# Patient Record
Sex: FEMALE | Race: WHITE | Employment: OTHER | URBAN - METROPOLITAN AREA
[De-identification: names, ages, dates, MRNs, and addresses within clinical notes are randomized per-mention and may not be internally consistent; named-entity substitution may affect disease eponyms.]

---

## 2018-08-14 ENCOUNTER — HOSPITAL ENCOUNTER (OUTPATIENT)
Age: 68
Discharge: HOME | End: 2018-08-14
Payer: MEDICARE

## 2018-08-14 LAB
ALBUMIN SERPL-MCNC: 4.3 G/DL (ref 3.5–5.2)
ALP BLD-CCNC: 89 U/L (ref 35–104)
ALT SERPL-CCNC: 27 U/L (ref 0–32)
ANION GAP SERPL CALCULATED.3IONS-SCNC: 11 MMOL/L (ref 7–16)
AST SERPL-CCNC: 31 U/L (ref 0–31)
BASOPHILS ABSOLUTE: 0.03 E9/L (ref 0–0.2)
BASOPHILS RELATIVE PERCENT: 0.4 % (ref 0–2)
BILIRUB SERPL-MCNC: 0.4 MG/DL (ref 0–1.2)
BUN BLDV-MCNC: 8 MG/DL (ref 8–23)
CALCIUM SERPL-MCNC: 10 MG/DL (ref 8.6–10.2)
CHLORIDE BLD-SCNC: 90 MMOL/L (ref 98–107)
CHOLESTEROL, FASTING: 143 MG/DL (ref 0–199)
CO2: 33 MMOL/L (ref 22–29)
CREAT SERPL-MCNC: 0.7 MG/DL (ref 0.5–1)
EOSINOPHILS ABSOLUTE: 0.14 E9/L (ref 0.05–0.5)
EOSINOPHILS RELATIVE PERCENT: 2.1 % (ref 0–6)
GFR AFRICAN AMERICAN: >60
GFR NON-AFRICAN AMERICAN: >60 ML/MIN/1.73
GLUCOSE FASTING: 117 MG/DL (ref 74–109)
HCT VFR BLD CALC: 37.4 % (ref 34–48)
HDLC SERPL-MCNC: 65 MG/DL
HEMOGLOBIN: 12.4 G/DL (ref 11.5–15.5)
IMMATURE GRANULOCYTES #: 0.02 E9/L
IMMATURE GRANULOCYTES %: 0.3 % (ref 0–5)
LDL CHOLESTEROL CALCULATED: 62 MG/DL (ref 0–99)
LYMPHOCYTES ABSOLUTE: 1.6 E9/L (ref 1.5–4)
LYMPHOCYTES RELATIVE PERCENT: 23.9 % (ref 20–42)
MCH RBC QN AUTO: 27.4 PG (ref 26–35)
MCHC RBC AUTO-ENTMCNC: 33.2 % (ref 32–34.5)
MCV RBC AUTO: 82.6 FL (ref 80–99.9)
MICROALBUMIN UR-MCNC: <12 MG/L
MONOCYTES ABSOLUTE: 0.74 E9/L (ref 0.1–0.95)
MONOCYTES RELATIVE PERCENT: 11.1 % (ref 2–12)
NEUTROPHILS ABSOLUTE: 4.16 E9/L (ref 1.8–7.3)
NEUTROPHILS RELATIVE PERCENT: 62.2 % (ref 43–80)
PDW BLD-RTO: 13.6 FL (ref 11.5–15)
PLATELET # BLD: 159 E9/L (ref 130–450)
PMV BLD AUTO: 10.5 FL (ref 7–12)
POTASSIUM SERPL-SCNC: 4.4 MMOL/L (ref 3.5–5)
RBC # BLD: 4.53 E12/L (ref 3.5–5.5)
SODIUM BLD-SCNC: 134 MMOL/L (ref 132–146)
T4 FREE: 1.25 NG/DL (ref 0.93–1.7)
TOTAL PROTEIN: 7.9 G/DL (ref 6.4–8.3)
TRIGLYCERIDE, FASTING: 82 MG/DL (ref 0–149)
TSH SERPL DL<=0.05 MIU/L-ACNC: 1.5 UIU/ML (ref 0.27–4.2)
VITAMIN D 25-HYDROXY: 52 NG/ML (ref 30–100)
VLDLC SERPL CALC-MCNC: 16 MG/DL
WBC # BLD: 6.7 E9/L (ref 4.5–11.5)

## 2018-08-14 PROCEDURE — 80061 LIPID PANEL: CPT

## 2018-08-14 PROCEDURE — 36415 COLL VENOUS BLD VENIPUNCTURE: CPT

## 2018-08-14 PROCEDURE — 84443 ASSAY THYROID STIM HORMONE: CPT

## 2018-08-14 PROCEDURE — 82044 UR ALBUMIN SEMIQUANTITATIVE: CPT

## 2018-08-14 PROCEDURE — 85025 COMPLETE CBC W/AUTO DIFF WBC: CPT

## 2018-08-14 PROCEDURE — 84439 ASSAY OF FREE THYROXINE: CPT

## 2018-08-14 PROCEDURE — 83036 HEMOGLOBIN GLYCOSYLATED A1C: CPT

## 2018-08-14 PROCEDURE — 80053 COMPREHEN METABOLIC PANEL: CPT

## 2018-08-14 PROCEDURE — 82306 VITAMIN D 25 HYDROXY: CPT

## 2018-08-15 LAB — HBA1C MFR BLD: 6.5 % (ref 4–5.6)

## 2019-02-07 ENCOUNTER — HOSPITAL ENCOUNTER (OUTPATIENT)
Dept: MAMMOGRAPHY | Age: 69
Discharge: HOME | End: 2019-02-09
Payer: MEDICARE

## 2019-02-07 ENCOUNTER — HOSPITAL ENCOUNTER (OUTPATIENT)
Dept: GENERAL RADIOLOGY | Age: 69
Discharge: HOME | End: 2019-02-09
Payer: MEDICARE

## 2019-02-07 ENCOUNTER — HOSPITAL ENCOUNTER (OUTPATIENT)
Age: 69
Discharge: HOME | End: 2019-02-09
Payer: MEDICARE

## 2019-02-07 DIAGNOSIS — Z12.31 SCREENING MAMMOGRAM, ENCOUNTER FOR: ICD-10-CM

## 2019-02-07 DIAGNOSIS — J44.9 CHRONIC OBSTRUCTIVE PULMONARY DISEASE, UNSPECIFIED COPD TYPE (HCC): ICD-10-CM

## 2019-02-07 PROCEDURE — 77067 SCR MAMMO BI INCL CAD: CPT

## 2019-02-07 PROCEDURE — 71046 X-RAY EXAM CHEST 2 VIEWS: CPT

## 2019-06-17 ENCOUNTER — HOSPITAL ENCOUNTER (OUTPATIENT)
Age: 69
Discharge: HOME | End: 2019-06-17
Payer: MEDICARE

## 2019-06-17 LAB
ALBUMIN SERPL-MCNC: 4.3 G/DL (ref 3.5–5.2)
ALP BLD-CCNC: 96 U/L (ref 35–104)
ALT SERPL-CCNC: 15 U/L (ref 0–32)
ANION GAP SERPL CALCULATED.3IONS-SCNC: 12 MMOL/L (ref 7–16)
AST SERPL-CCNC: 19 U/L (ref 0–31)
BASOPHILS ABSOLUTE: 0.05 E9/L (ref 0–0.2)
BASOPHILS RELATIVE PERCENT: 0.8 % (ref 0–2)
BILIRUB SERPL-MCNC: 0.4 MG/DL (ref 0–1.2)
BUN BLDV-MCNC: 11 MG/DL (ref 8–23)
CALCIUM SERPL-MCNC: 10.1 MG/DL (ref 8.6–10.2)
CHLORIDE BLD-SCNC: 98 MMOL/L (ref 98–107)
CHOLESTEROL, FASTING: 160 MG/DL (ref 0–199)
CO2: 32 MMOL/L (ref 22–29)
CREAT SERPL-MCNC: 0.8 MG/DL (ref 0.5–1)
EOSINOPHILS ABSOLUTE: 0.36 E9/L (ref 0.05–0.5)
EOSINOPHILS RELATIVE PERCENT: 5.8 % (ref 0–6)
GFR AFRICAN AMERICAN: >60
GFR NON-AFRICAN AMERICAN: >60 ML/MIN/1.73
GLUCOSE FASTING: 119 MG/DL (ref 74–99)
HBA1C MFR BLD: 6.5 % (ref 4–5.6)
HCT VFR BLD CALC: 39.7 % (ref 34–48)
HDLC SERPL-MCNC: 66 MG/DL
HEMOGLOBIN: 12.7 G/DL (ref 11.5–15.5)
IMMATURE GRANULOCYTES #: 0.03 E9/L
IMMATURE GRANULOCYTES %: 0.5 % (ref 0–5)
LDL CHOLESTEROL CALCULATED: 63 MG/DL (ref 0–99)
LYMPHOCYTES ABSOLUTE: 1.8 E9/L (ref 1.5–4)
LYMPHOCYTES RELATIVE PERCENT: 28.8 % (ref 20–42)
MCH RBC QN AUTO: 26.8 PG (ref 26–35)
MCHC RBC AUTO-ENTMCNC: 32 % (ref 32–34.5)
MCV RBC AUTO: 83.9 FL (ref 80–99.9)
MICROALBUMIN UR-MCNC: <12 MG/L
MONOCYTES ABSOLUTE: 0.6 E9/L (ref 0.1–0.95)
MONOCYTES RELATIVE PERCENT: 9.6 % (ref 2–12)
NEUTROPHILS ABSOLUTE: 3.41 E9/L (ref 1.8–7.3)
NEUTROPHILS RELATIVE PERCENT: 54.5 % (ref 43–80)
PDW BLD-RTO: 14.1 FL (ref 11.5–15)
PLATELET # BLD: 174 E9/L (ref 130–450)
PMV BLD AUTO: 10.7 FL (ref 7–12)
POTASSIUM SERPL-SCNC: 4.3 MMOL/L (ref 3.5–5)
RBC # BLD: 4.73 E12/L (ref 3.5–5.5)
SODIUM BLD-SCNC: 142 MMOL/L (ref 132–146)
T4 FREE: 1.11 NG/DL (ref 0.93–1.7)
TOTAL PROTEIN: 8.2 G/DL (ref 6.4–8.3)
TRIGLYCERIDE, FASTING: 154 MG/DL (ref 0–149)
VITAMIN D 25-HYDROXY: 44 NG/ML (ref 30–100)
VLDLC SERPL CALC-MCNC: 31 MG/DL
WBC # BLD: 6.3 E9/L (ref 4.5–11.5)

## 2019-06-17 PROCEDURE — 80053 COMPREHEN METABOLIC PANEL: CPT

## 2019-06-17 PROCEDURE — 83036 HEMOGLOBIN GLYCOSYLATED A1C: CPT

## 2019-06-17 PROCEDURE — 85025 COMPLETE CBC W/AUTO DIFF WBC: CPT

## 2019-06-17 PROCEDURE — 82044 UR ALBUMIN SEMIQUANTITATIVE: CPT

## 2019-06-17 PROCEDURE — 80061 LIPID PANEL: CPT

## 2019-06-17 PROCEDURE — 84443 ASSAY THYROID STIM HORMONE: CPT

## 2019-06-17 PROCEDURE — 82306 VITAMIN D 25 HYDROXY: CPT

## 2019-06-17 PROCEDURE — 36415 COLL VENOUS BLD VENIPUNCTURE: CPT

## 2019-06-17 PROCEDURE — 84439 ASSAY OF FREE THYROXINE: CPT

## 2019-06-18 LAB — TSH SERPL DL<=0.05 MIU/L-ACNC: 2.19 UIU/ML (ref 0.27–4.2)

## 2020-05-04 ENCOUNTER — HOSPITAL ENCOUNTER (OUTPATIENT)
Age: 70
Discharge: HOME | End: 2020-05-04
Payer: MEDICARE

## 2020-05-04 LAB
ALBUMIN SERPL-MCNC: 4.4 G/DL (ref 3.5–5.2)
ALP BLD-CCNC: 98 U/L (ref 35–104)
ALT SERPL-CCNC: 17 U/L (ref 0–32)
ANION GAP SERPL CALCULATED.3IONS-SCNC: 16 MMOL/L (ref 7–16)
AST SERPL-CCNC: 22 U/L (ref 0–31)
BASOPHILS ABSOLUTE: 0.04 E9/L (ref 0–0.2)
BASOPHILS RELATIVE PERCENT: 0.6 % (ref 0–2)
BILIRUB SERPL-MCNC: 0.4 MG/DL (ref 0–1.2)
BUN BLDV-MCNC: 10 MG/DL (ref 8–23)
CALCIUM SERPL-MCNC: 10.3 MG/DL (ref 8.6–10.2)
CHLORIDE BLD-SCNC: 94 MMOL/L (ref 98–107)
CHOLESTEROL, TOTAL: 154 MG/DL (ref 0–199)
CO2: 28 MMOL/L (ref 22–29)
CREAT SERPL-MCNC: 0.7 MG/DL (ref 0.5–1)
EOSINOPHILS ABSOLUTE: 0.27 E9/L (ref 0.05–0.5)
EOSINOPHILS RELATIVE PERCENT: 3.9 % (ref 0–6)
GFR AFRICAN AMERICAN: >60
GFR NON-AFRICAN AMERICAN: >60 ML/MIN/1.73
GLUCOSE BLD-MCNC: 121 MG/DL (ref 74–99)
HBA1C MFR BLD: 6.4 % (ref 4–5.6)
HCT VFR BLD CALC: 39.2 % (ref 34–48)
HDLC SERPL-MCNC: 58 MG/DL
HEMOGLOBIN: 12.8 G/DL (ref 11.5–15.5)
IMMATURE GRANULOCYTES #: 0.02 E9/L
IMMATURE GRANULOCYTES %: 0.3 % (ref 0–5)
LDL CHOLESTEROL CALCULATED: 79 MG/DL (ref 0–99)
LYMPHOCYTES ABSOLUTE: 1.77 E9/L (ref 1.5–4)
LYMPHOCYTES RELATIVE PERCENT: 25.6 % (ref 20–42)
MCH RBC QN AUTO: 27.6 PG (ref 26–35)
MCHC RBC AUTO-ENTMCNC: 32.7 % (ref 32–34.5)
MCV RBC AUTO: 84.7 FL (ref 80–99.9)
MONOCYTES ABSOLUTE: 0.6 E9/L (ref 0.1–0.95)
MONOCYTES RELATIVE PERCENT: 8.7 % (ref 2–12)
NEUTROPHILS ABSOLUTE: 4.22 E9/L (ref 1.8–7.3)
NEUTROPHILS RELATIVE PERCENT: 60.9 % (ref 43–80)
PDW BLD-RTO: 14.3 FL (ref 11.5–15)
PLATELET # BLD: 161 E9/L (ref 130–450)
PMV BLD AUTO: 10.7 FL (ref 7–12)
POTASSIUM SERPL-SCNC: 4.1 MMOL/L (ref 3.5–5)
RBC # BLD: 4.63 E12/L (ref 3.5–5.5)
SODIUM BLD-SCNC: 138 MMOL/L (ref 132–146)
TOTAL PROTEIN: 8.3 G/DL (ref 6.4–8.3)
TRIGL SERPL-MCNC: 83 MG/DL (ref 0–149)
TSH SERPL DL<=0.05 MIU/L-ACNC: 1.55 UIU/ML (ref 0.27–4.2)
VLDLC SERPL CALC-MCNC: 17 MG/DL
WBC # BLD: 6.9 E9/L (ref 4.5–11.5)

## 2020-05-04 PROCEDURE — 84443 ASSAY THYROID STIM HORMONE: CPT

## 2020-05-04 PROCEDURE — 80061 LIPID PANEL: CPT

## 2020-05-04 PROCEDURE — 36415 COLL VENOUS BLD VENIPUNCTURE: CPT

## 2020-05-04 PROCEDURE — 83036 HEMOGLOBIN GLYCOSYLATED A1C: CPT

## 2020-05-04 PROCEDURE — 85025 COMPLETE CBC W/AUTO DIFF WBC: CPT

## 2020-05-04 PROCEDURE — 80053 COMPREHEN METABOLIC PANEL: CPT

## 2020-07-01 ENCOUNTER — HOSPITAL ENCOUNTER (OUTPATIENT)
Dept: MAMMOGRAPHY | Age: 70
Discharge: HOME | End: 2020-07-03
Payer: MEDICARE

## 2020-07-01 PROCEDURE — 77067 SCR MAMMO BI INCL CAD: CPT

## 2020-09-28 VITALS
RESPIRATION RATE: 14 BRPM | TEMPERATURE: 96.8 F | WEIGHT: 168 LBS | DIASTOLIC BLOOD PRESSURE: 80 MMHG | SYSTOLIC BLOOD PRESSURE: 140 MMHG | BODY MASS INDEX: 25.54 KG/M2 | HEART RATE: 82 BPM

## 2020-09-28 RX ORDER — GLIPIZIDE 5 MG/1
5 TABLET, FILM COATED, EXTENDED RELEASE ORAL DAILY
COMMUNITY
End: 2021-03-22

## 2020-09-28 RX ORDER — METFORMIN HYDROCHLORIDE 500 MG/1
500 TABLET, EXTENDED RELEASE ORAL
COMMUNITY
End: 2021-03-22

## 2020-09-28 RX ORDER — LOSARTAN POTASSIUM AND HYDROCHLOROTHIAZIDE 12.5; 5 MG/1; MG/1
1 TABLET ORAL DAILY
COMMUNITY
End: 2020-12-31

## 2020-10-24 ENCOUNTER — HOSPITAL ENCOUNTER (OUTPATIENT)
Age: 70
Discharge: HOME | End: 2020-10-24
Payer: MEDICARE

## 2020-10-24 LAB
ALBUMIN SERPL-MCNC: 4.2 G/DL (ref 3.5–5.2)
ALP BLD-CCNC: 91 U/L (ref 35–104)
ALT SERPL-CCNC: 31 U/L (ref 0–32)
ANION GAP SERPL CALCULATED.3IONS-SCNC: 11 MMOL/L (ref 7–16)
AST SERPL-CCNC: 35 U/L (ref 0–31)
BASOPHILS ABSOLUTE: 0.04 E9/L (ref 0–0.2)
BASOPHILS RELATIVE PERCENT: 0.6 % (ref 0–2)
BILIRUB SERPL-MCNC: 0.3 MG/DL (ref 0–1.2)
BUN BLDV-MCNC: 12 MG/DL (ref 8–23)
CALCIUM SERPL-MCNC: 9.8 MG/DL (ref 8.6–10.2)
CHLORIDE BLD-SCNC: 97 MMOL/L (ref 98–107)
CHOLESTEROL, FASTING: 142 MG/DL (ref 0–199)
CO2: 29 MMOL/L (ref 22–29)
CREAT SERPL-MCNC: 0.8 MG/DL (ref 0.5–1)
EOSINOPHILS ABSOLUTE: 0.35 E9/L (ref 0.05–0.5)
EOSINOPHILS RELATIVE PERCENT: 5.1 % (ref 0–6)
GFR AFRICAN AMERICAN: >60
GFR NON-AFRICAN AMERICAN: >60 ML/MIN/1.73
GLUCOSE FASTING: 116 MG/DL (ref 74–99)
HBA1C MFR BLD: 6.5 % (ref 4–5.6)
HCT VFR BLD CALC: 38.3 % (ref 34–48)
HDLC SERPL-MCNC: 63 MG/DL
HEMOGLOBIN: 12.5 G/DL (ref 11.5–15.5)
IMMATURE GRANULOCYTES #: 0.02 E9/L
IMMATURE GRANULOCYTES %: 0.3 % (ref 0–5)
LDL CHOLESTEROL CALCULATED: 59 MG/DL (ref 0–99)
LYMPHOCYTES ABSOLUTE: 1.93 E9/L (ref 1.5–4)
LYMPHOCYTES RELATIVE PERCENT: 28 % (ref 20–42)
MCH RBC QN AUTO: 28.1 PG (ref 26–35)
MCHC RBC AUTO-ENTMCNC: 32.6 % (ref 32–34.5)
MCV RBC AUTO: 86.1 FL (ref 80–99.9)
MONOCYTES ABSOLUTE: 0.61 E9/L (ref 0.1–0.95)
MONOCYTES RELATIVE PERCENT: 8.8 % (ref 2–12)
NEUTROPHILS ABSOLUTE: 3.95 E9/L (ref 1.8–7.3)
NEUTROPHILS RELATIVE PERCENT: 57.2 % (ref 43–80)
PDW BLD-RTO: 14.3 FL (ref 11.5–15)
PLATELET # BLD: 174 E9/L (ref 130–450)
PMV BLD AUTO: 10.2 FL (ref 7–12)
POTASSIUM SERPL-SCNC: 4.7 MMOL/L (ref 3.5–5)
RBC # BLD: 4.45 E12/L (ref 3.5–5.5)
SODIUM BLD-SCNC: 137 MMOL/L (ref 132–146)
TOTAL PROTEIN: 7.9 G/DL (ref 6.4–8.3)
TRIGLYCERIDE, FASTING: 99 MG/DL (ref 0–149)
VLDLC SERPL CALC-MCNC: 20 MG/DL
WBC # BLD: 6.9 E9/L (ref 4.5–11.5)

## 2020-10-24 PROCEDURE — 83036 HEMOGLOBIN GLYCOSYLATED A1C: CPT

## 2020-10-24 PROCEDURE — 36415 COLL VENOUS BLD VENIPUNCTURE: CPT

## 2020-10-24 PROCEDURE — 80061 LIPID PANEL: CPT

## 2020-10-24 PROCEDURE — 85025 COMPLETE CBC W/AUTO DIFF WBC: CPT

## 2020-10-24 PROCEDURE — 80053 COMPREHEN METABOLIC PANEL: CPT

## 2020-11-04 RX ORDER — OMEPRAZOLE 40 MG/1
40 CAPSULE, DELAYED RELEASE ORAL DAILY
Qty: 30 CAPSULE | Refills: 0 | Status: SHIPPED
Start: 2020-11-04 | End: 2021-03-22

## 2020-12-14 RX ORDER — OMEPRAZOLE 40 MG/1
40 CAPSULE, DELAYED RELEASE ORAL
Qty: 90 CAPSULE | Refills: 1 | Status: SHIPPED
Start: 2020-12-14 | End: 2021-01-03

## 2020-12-23 ENCOUNTER — TELEPHONE (OUTPATIENT)
Dept: FAMILY MEDICINE CLINIC | Age: 70
End: 2020-12-23

## 2020-12-23 NOTE — TELEPHONE ENCOUNTER
Pt called Rx for handicap placard needs expiration date it can be faxed to  [phone]      Last seen Visit date not found  Next appt 1/13/2021

## 2020-12-28 ENCOUNTER — TELEPHONE (OUTPATIENT)
Dept: FAMILY MEDICINE CLINIC | Age: 70
End: 2020-12-28

## 2020-12-31 RX ORDER — LOSARTAN POTASSIUM AND HYDROCHLOROTHIAZIDE 12.5; 5 MG/1; MG/1
TABLET ORAL
Qty: 90 TABLET | Refills: 1 | Status: SHIPPED
Start: 2020-12-31 | End: 2021-01-13

## 2021-01-03 ENCOUNTER — APPOINTMENT (OUTPATIENT)
Dept: GENERAL RADIOLOGY | Age: 71
End: 2021-01-03
Payer: MEDICARE

## 2021-01-03 ENCOUNTER — HOSPITAL ENCOUNTER (EMERGENCY)
Age: 71
Discharge: HOME | End: 2021-01-03
Attending: STUDENT IN AN ORGANIZED HEALTH CARE EDUCATION/TRAINING PROGRAM
Payer: MEDICARE

## 2021-01-03 VITALS
WEIGHT: 160 LBS | SYSTOLIC BLOOD PRESSURE: 144 MMHG | HEART RATE: 106 BPM | OXYGEN SATURATION: 98 % | DIASTOLIC BLOOD PRESSURE: 97 MMHG | HEIGHT: 68 IN | TEMPERATURE: 97.7 F | BODY MASS INDEX: 24.25 KG/M2 | RESPIRATION RATE: 16 BRPM

## 2021-01-03 DIAGNOSIS — T14.8XXA HEMATOMA: Primary | ICD-10-CM

## 2021-01-03 PROCEDURE — 73590 X-RAY EXAM OF LOWER LEG: CPT

## 2021-01-03 PROCEDURE — G0382 LEV 3 HOSP TYPE B ED VISIT: HCPCS

## 2021-01-03 NOTE — ED PROVIDER NOTES
HPI:  1/3/21,   Time: 12:12 PM ATA De Anda is a 72 y.o. female presenting to the ED for right tibial swelling. Patient states that 1 month ago she bumped into a coffee table on her right mid shin. She had significant swelling about the size of a baseball. Over time the swelling has reduced and for the last 2 weeks this is the size of a golf ball. It is tender when she touches the area. She also noticed some bruising that spread down towards her foot initially on the medial side and now spreading towards the lateral side. Patient denies any difficulty with ambulation, pain with ambulation. She only takes aspirin and denies any other blood thinner use. Denies any sensory or motor changes. ROS:   Pertinent positives and negatives are stated within HPI, all other systems reviewed and are negative.  --------------------------------------------- PAST HISTORY ---------------------------------------------  Past Medical History:  has a past medical history of Asthma, COPD (chronic obstructive pulmonary disease) ([facility] [address] Depression, Diabetes mellitus ([facility] [address] GERD (gastroesophageal reflux disease), Hyperlipidemia, Hypertension, and Type 2 diabetes mellitus without complication ([facility] [address] Past Surgical History:  has a past surgical history that includes Appendectomy; Carpal tunnel release; Tubal ligation; Bunionectomy; Hammer toe surgery; Cardiac catheterization; Total knee arthroplasty (May, 2015); and knee joint manipulation (Right, 6/18/15). Social History:  reports that she quit smoking about 13 years ago. Her smoking use included cigarettes. She has a 30.00 pack-year smoking history. She quit smokeless tobacco use about 14 years ago. She reports current alcohol use of about 2.5 standard drinks of alcohol per week. She reports that she does not use drugs.     Family History: family history includes Cancer in her sister; Diabetes in her mother; Heart Disease in her father; High Blood Pressure in her brother and sister; Kidney Disease in her mother; Devin Ovalle in her sister and son. The patients home medications have been reviewed. Allergies: Ace inhibitors    -------------------------------------------------- RESULTS -------------------------------------------------  All laboratory and radiology results have been personally reviewed by myself   LABS:  No results found for this visit on 01/03/21. RADIOLOGY:  Interpreted by Radiologist.  XR TIBIA FIBULA RIGHT (2 VIEWS)   Final Result   1. There is no fracture or acute abnormality of the right tibia and fibula.          ------------------------- NURSING NOTES AND VITALS REVIEWED ---------------------------   The nursing notes within the ED encounter and vital signs as below have been reviewed. BP (!) 144/97   Pulse 106   Temp 97.7 °F (36.5 °C) (Temporal)   Resp 16   Ht 5' 8\" (1.727 m)   Wt 160 lb (72.6 kg)   SpO2 98%   BMI 24.33 kg/m²   Oxygen Saturation Interpretation: Normal      ---------------------------------------------------PHYSICAL EXAM--------------------------------------    Constitutional/General: Alert and oriented x3, well appearing, non toxic in NAD  Head: NC/AT  Eyes: PERRL, EOMI  Mouth: Oropharynx clear, handling secretions, no trismus  Neck: Supple, full ROM, no meningeal signs  Pulmonary: Lungs clear to auscultation bilaterally, no wheezes, rales, or rhonchi. Not in respiratory distress  Cardiovascular:  Regular rate and rhythm, no murmurs, gallops, or rubs. 2+ distal pulses  Abdomen: Soft, non tender, non distended,   Extremities: Moves all extremities x 4. Warm and well perfused  Skin: Small swelling over the mid medial right tibia. Mild tenderness to palpation. Firm. Nonmobile. No posterior calf tenderness. Dorsalis pedis and posterior tibial pulses 2+. Sensation intact to lower extremity to light touch. Capillary refill less than 3 seconds.   Neurologic: GCS 15,  Psych: Normal Affect      ------------------------------ ED COURSE/MEDICAL DECISION MAKING----------------------  Medications - No data to display      Medical Decision Makin-year-old female with a suspected hematoma to right tibia. Vitals within normal limits (elevated HR on triage vitals improved by the time of my exam). Exam does reveal swelling that is firm, nonmobile. I suspect hematoma however would like to rule out other pathology with an x-ray. This was ordered. This did not show any acute abnormalities or fractures, previous or cute. I do feel patient is safe for discharge home. She does not have any evidence of DVT on exam.  She is neurovascularly intact. I recommend that she follow-up with her primary care doctor regarding a suspected chronic hematoma. Did recommend warm compresses. Anti-inflammatories for pain. Patient states understanding agreement will follow up with her primary care physician as discussed. Counseling: The emergency provider has spoken with the patient and discussed todays results, in addition to providing specific details for the plan of care and counseling regarding the diagnosis and prognosis. Questions are answered at this time and they are agreeable with the plan.      --------------------------------- IMPRESSION AND DISPOSITION ---------------------------------    IMPRESSION  1.  Hematoma        DISPOSITION  Disposition: Discharge to home  Patient condition is good                 Farrah Angulo, DO  [address], DO  21 [phone]

## 2021-01-13 ENCOUNTER — OFFICE VISIT (OUTPATIENT)
Dept: FAMILY MEDICINE CLINIC | Age: 71
End: 2021-01-13
Payer: MEDICARE

## 2021-01-13 VITALS
TEMPERATURE: 97.5 F | WEIGHT: 168 LBS | HEIGHT: 66 IN | SYSTOLIC BLOOD PRESSURE: 114 MMHG | HEART RATE: 98 BPM | DIASTOLIC BLOOD PRESSURE: 78 MMHG | BODY MASS INDEX: 27 KG/M2 | OXYGEN SATURATION: 97 %

## 2021-01-13 DIAGNOSIS — E11.9 CONTROLLED TYPE 2 DIABETES MELLITUS WITHOUT COMPLICATION, WITHOUT LONG-TERM CURRENT USE OF INSULIN (HCC): ICD-10-CM

## 2021-01-13 DIAGNOSIS — J44.9 CHRONIC OBSTRUCTIVE PULMONARY DISEASE, UNSPECIFIED COPD TYPE (HCC): ICD-10-CM

## 2021-01-13 DIAGNOSIS — S80.11XA HEMATOMA OF RIGHT LOWER LEG: ICD-10-CM

## 2021-01-13 DIAGNOSIS — I10 ESSENTIAL HYPERTENSION: ICD-10-CM

## 2021-01-13 DIAGNOSIS — F32.A DEPRESSION, UNSPECIFIED DEPRESSION TYPE: ICD-10-CM

## 2021-01-13 DIAGNOSIS — T78.3XXD ANGIOEDEMA, SUBSEQUENT ENCOUNTER: Primary | ICD-10-CM

## 2021-01-13 DIAGNOSIS — J43.9 PULMONARY EMPHYSEMA, UNSPECIFIED EMPHYSEMA TYPE (HCC): ICD-10-CM

## 2021-01-13 DIAGNOSIS — E78.5 HYPERLIPIDEMIA, UNSPECIFIED HYPERLIPIDEMIA TYPE: ICD-10-CM

## 2021-01-13 PROCEDURE — G8484 FLU IMMUNIZE NO ADMIN: HCPCS | Performed by: INTERNAL MEDICINE

## 2021-01-13 PROCEDURE — G8427 DOCREV CUR MEDS BY ELIG CLIN: HCPCS | Performed by: INTERNAL MEDICINE

## 2021-01-13 PROCEDURE — G8926 SPIRO NO PERF OR DOC: HCPCS | Performed by: INTERNAL MEDICINE

## 2021-01-13 PROCEDURE — 3023F SPIROM DOC REV: CPT | Performed by: INTERNAL MEDICINE

## 2021-01-13 PROCEDURE — 4040F PNEUMOC VAC/ADMIN/RCVD: CPT | Performed by: INTERNAL MEDICINE

## 2021-01-13 PROCEDURE — 3046F HEMOGLOBIN A1C LEVEL >9.0%: CPT | Performed by: INTERNAL MEDICINE

## 2021-01-13 PROCEDURE — G8400 PT W/DXA NO RESULTS DOC: HCPCS | Performed by: INTERNAL MEDICINE

## 2021-01-13 PROCEDURE — 1036F TOBACCO NON-USER: CPT | Performed by: INTERNAL MEDICINE

## 2021-01-13 PROCEDURE — 2022F DILAT RTA XM EVC RTNOPTHY: CPT | Performed by: INTERNAL MEDICINE

## 2021-01-13 PROCEDURE — G8417 CALC BMI ABV UP PARAM F/U: HCPCS | Performed by: INTERNAL MEDICINE

## 2021-01-13 PROCEDURE — 99214 OFFICE O/P EST MOD 30 MIN: CPT | Performed by: INTERNAL MEDICINE

## 2021-01-13 PROCEDURE — 3017F COLORECTAL CA SCREEN DOC REV: CPT | Performed by: INTERNAL MEDICINE

## 2021-01-13 PROCEDURE — 1123F ACP DISCUSS/DSCN MKR DOCD: CPT | Performed by: INTERNAL MEDICINE

## 2021-01-13 PROCEDURE — 1090F PRES/ABSN URINE INCON ASSESS: CPT | Performed by: INTERNAL MEDICINE

## 2021-01-13 RX ORDER — AMLODIPINE BESYLATE 5 MG/1
5 TABLET ORAL DAILY
Qty: 30 TABLET | Refills: 3 | Status: SHIPPED
Start: 2021-01-13 | End: 2021-01-26 | Stop reason: SDUPTHER

## 2021-01-13 RX ORDER — METHYLPREDNISOLONE 4 MG/1
TABLET ORAL
Qty: 1 KIT | Refills: 0 | Status: SHIPPED | OUTPATIENT
Start: 2021-01-13 | End: 2021-01-19

## 2021-01-13 SDOH — ECONOMIC STABILITY: TRANSPORTATION INSECURITY
IN THE PAST 12 MONTHS, HAS LACK OF TRANSPORTATION KEPT YOU FROM MEETINGS, WORK, OR FROM GETTING THINGS NEEDED FOR DAILY LIVING?: NO

## 2021-01-13 ASSESSMENT — PATIENT HEALTH QUESTIONNAIRE - PHQ9
2. FEELING DOWN, DEPRESSED OR HOPELESS: 0
1. LITTLE INTEREST OR PLEASURE IN DOING THINGS: 0

## 2021-01-13 NOTE — PROGRESS NOTES
Subjective:     Chief Complaint   Patient presents with    Facial Swelling   Complains of swelling of the face on and off for the past 6 months the lips and the face started to swell she takes a Claritin  She has taken Benadryl which makes her sleepy  She has gone to urgent care she was given Medrol Dosepak    The face was swollen twice in the last 3 months it last for few hours and gets better  Denies any tongue swelling or shortness of breath    Patient is taking losartan HCTZ for the blood pressure    She denies any other allergies      A month ago she hit her right leg against the table there was a little swelling on the medial aspect middle of the right lower leg she did go to urgent care had x-rays done which was reviewed which showed swelling which is a hematoma which is improving    Follow-up on the COPD has occasional dyspnea on exertion denies any chest pain or palpitation      She had a cardiac catheterization  with Dr. Leoncio Robles which was negative      Follow-up on the hypertension          Past Medical History:   Diagnosis Date    Asthma     [facility] obstructive [facility] ([facility]     Depression     Diabetes mellitus ([facility]     GERD (gastroesophageal reflux disease)     Hyperlipidemia     Hypertension     Type 2 diabetes mellitus without complication ([facility]         Social History     Socioeconomic History    Marital status:       Spouse name: Not on file    Number of children: Not on file    Years of education: Not on file    Highest education level: Not on file   Occupational History    Not on file   Social Needs    Financial resource strain: Not on file    Food insecurity     Worry: Never true     Inability: Never true    Transportation needs     Medical: No     Non-medical: No   Tobacco Use    Smoking status: Former Smoker     Packs/day: 1.00     Years: 30.00     Pack years: 30.00     Types: Cigarettes     Quit date: 2007     Years since quittin.3  Smokeless tobacco: Former User     Quit date: 2006   Substance and Sexual Activity    Alcohol use: Yes     Alcohol/week: 2.5 standard drinks     Types: 3 Standard drinks or equivalent per week     Comment: occ    Drug use: No    Sexual activity: Not on file   Lifestyle    Physical activity     Days per week: Not on file     Minutes per session: Not on file    Stress: Not on file   Relationships    Social connections     Talks on phone: Not on file     Gets together: Not on file     Attends Voodoo service: Not on file     Active member of club or organization: Not on file     Attends meetings of clubs or organizations: Not on file     Relationship status: Not on file    Intimate partner violence     Fear of current or ex partner: Not on file     Emotionally abused: Not on file     Physically abused: Not on file     Forced sexual activity: Not on file   Other Topics Concern    Not on file   Social History Narrative    Not on file        Past Surgical History:   Procedure Laterality Date    APPENDECTOMY      BUNIONECTOMY      rt   [address]       states was normal    CARPAL TUNNEL RELEASE      rt hand    HAMMER TOE SURGERY      rt    KNEE JOINT MANIPULATION Right 6/18/15    TOTAL KNEE ARTHROPLASTY  May, 2015    Right    TUBAL LIGATION          Family History   Problem Relation Age of Onset    Diabetes Mother     Kidney Disease Mother     Heart Disease Father     High Blood Pressure Sister     Lung Cancer Sister     High Blood Pressure Brother     Cancer Sister         leukemia    Lung Cancer Son         w/ brain mets  age 55        Allergies   Allergen Reactions    Ace Inhibitors      Cough           ROS  No acute distress  Cardiac: Denies any chest pain or palpitation  Respiratory: Denies any cough , minimal dyspnea with exertion from COPD possible  GI: No abdominal pain.  Denies any nausea vomiting or diarrhea had colonoscopy  by Dr. Katherine Kennedy : Denies any dysuria frequency or hematuria  Neuro: No headache or dizziness  Endocrine: No diabetes  Skin: normal  No recent weight gain or weight loss  Denies any change in vision    Objective:    /78   Pulse 98   Temp 97.5 °F (36.4 °C)   Ht 5' 5.5\" (1.664 m)   Wt 168 lb (76.2 kg)   SpO2 97%   BMI 27.53 kg/m²     Constitutional: Alert awake and oriented  Eyes: Pupils equal bilaterally. Extraocular muscles intact  Neck: no JVD adenopathy no bruit no adenopathy in the neck  Heart:  RRR, no murmurs, gallops, or rubs. Lungs:    no wheeze, rales or rhonchi  Abd: bowel sounds present, nontender, nondistended, no masses  Extrem:  No clubbing, cyanosis, hematoma resolving right lower leg medial aspect nontender no signs of infection  Neuro: AAOx3,No Focal deficit  Psychological: no depression or anxiety   Distal pulses intact in the feet  No calf tenderness  Homans test negative bilaterally    Current Outpatient Medications   Medication Sig Dispense Refill    amLODIPine (NORVASC) 5 MG tablet Take 1 tablet by mouth daily 30 tablet 3    methylPREDNISolone (MEDROL DOSEPACK) 4 MG tablet Take by mouth. 1 kit 0    omeprazole (PRILOSEC) 40 MG delayed release capsule Take 1 capsule by mouth daily 30 capsule 0    metFORMIN (GLUCOPHAGE-XR) 500 MG extended release tablet Take 500 mg by mouth 3 times daily (before meals)      glipiZIDE (GLIPIZIDE XL) 5 MG extended release tablet Take 5 mg by mouth daily      aspirin 81 MG tablet Take 81 mg by mouth every evening      citalopram (CELEXA) 20 MG tablet Take 20 mg by mouth daily evening      pravastatin (PRAVACHOL) 40 MG tablet Take 40 mg by mouth evening       No current facility-administered medications for this visit.          Last 3 BMP  Lab Results   Component Value Date/Time     10/24/2020 09:04 AM     05/04/2020 11:45 AM     06/17/2019 09:12 AM    K 4.7 10/24/2020 09:04 AM    K 4.1 05/04/2020 11:45 AM    K 4.3 06/17/2019 09:12 AM CL 97 (L) 10/24/2020 09:04 AM    CL 94 (L) 05/04/2020 11:45 AM    CL 98 06/17/2019 09:12 AM    CO2 29 10/24/2020 09:04 AM    CO2 28 05/04/2020 11:45 AM    CO2 32 (H) 06/17/2019 09:12 AM    BUN 12 10/24/2020 09:04 AM    BUN 10 05/04/2020 11:45 AM    BUN 11 06/17/2019 09:12 AM    CREATININE 0.8 10/24/2020 09:04 AM    CREATININE 0.7 05/04/2020 11:45 AM    CREATININE 0.8 06/17/2019 09:12 AM    GLUCOSE 121 (H) 05/04/2020 11:45 AM    GLUCOSE 105 12/22/2017 09:05 AM    GLUCOSE 119 (H) 01/26/2017 09:05 AM    GLUCOSE 111 (H) 03/10/2012 08:10 AM    GLUCOSE 114 (H) 05/14/2011 08:00 AM    CALCIUM 9.8 10/24/2020 09:04 AM    CALCIUM 10.3 (H) 05/04/2020 11:45 AM    CALCIUM 10.1 06/17/2019 09:12 AM       Last 3 CMP:    Lab Results   Component Value Date/Time     10/24/2020 09:04 AM     05/04/2020 11:45 AM     06/17/2019 09:12 AM    K 4.7 10/24/2020 09:04 AM    K 4.1 05/04/2020 11:45 AM    K 4.3 06/17/2019 09:12 AM    CL 97 (L) 10/24/2020 09:04 AM    CL 94 (L) 05/04/2020 11:45 AM    CL 98 06/17/2019 09:12 AM    CO2 29 10/24/2020 09:04 AM    CO2 28 05/04/2020 11:45 AM    CO2 32 (H) 06/17/2019 09:12 AM    BUN 12 10/24/2020 09:04 AM    BUN 10 05/04/2020 11:45 AM    BUN 11 06/17/2019 09:12 AM    CREATININE 0.8 10/24/2020 09:04 AM    CREATININE 0.7 05/04/2020 11:45 AM    CREATININE 0.8 06/17/2019 09:12 AM    GLUCOSE 121 (H) 05/04/2020 11:45 AM    GLUCOSE 105 12/22/2017 09:05 AM    GLUCOSE 119 (H) 01/26/2017 09:05 AM    GLUCOSE 111 (H) 03/10/2012 08:10 AM    GLUCOSE 114 (H) 05/14/2011 08:00 AM    CALCIUM 9.8 10/24/2020 09:04 AM    CALCIUM 10.3 (H) 05/04/2020 11:45 AM    CALCIUM 10.1 06/17/2019 09:12 AM    PROT 7.9 10/24/2020 09:04 AM    PROT 8.3 05/04/2020 11:45 AM    PROT 8.2 06/17/2019 09:12 AM    LABALBU 4.2 10/24/2020 09:04 AM    LABALBU 4.4 05/04/2020 11:45 AM    LABALBU 4.3 06/17/2019 09:12 AM    LABALBU 4.6 03/10/2012 08:10 AM    LABALBU 4.5 05/14/2011 08:00 AM    BILITOT 0.3 10/24/2020 09:04 AM BILITOT 0.4 05/04/2020 11:45 AM    BILITOT 0.4 06/17/2019 09:12 AM    ALKPHOS 91 10/24/2020 09:04 AM    ALKPHOS 98 05/04/2020 11:45 AM    ALKPHOS 96 06/17/2019 09:12 AM    AST 35 (H) 10/24/2020 09:04 AM    AST 22 05/04/2020 11:45 AM    AST 19 06/17/2019 09:12 AM    ALT 31 10/24/2020 09:04 AM    ALT 17 05/04/2020 11:45 AM    ALT 15 06/17/2019 09:12 AM        CBC:   Lab Results   Component Value Date/Time    WBC 6.9 10/24/2020 09:04 AM    RBC 4.45 10/24/2020 09:04 AM    HGB 12.5 10/24/2020 09:04 AM    HCT 38.3 10/24/2020 09:04 AM    MCV 86.1 10/24/2020 09:04 AM    MCH 28.1 10/24/2020 09:04 AM    MCHC 32.6 10/24/2020 09:04 AM    RDW 14.3 10/24/2020 09:04 AM     10/24/2020 09:04 AM    MPV 10.2 10/24/2020 09:04 AM       A1C:  Lab Results   Component Value Date/Time    LABA1C 6.5 (H) 10/24/2020 09:04 AM       Lipid panel:  Lab Results   Component Value Date    CHOL 154 05/04/2020    CHOL 183 01/26/2017    CHOL 168 03/12/2016    TRIG 83 05/04/2020    TRIG 95 01/26/2017    TRIG 112 03/12/2016    HDL 63 10/24/2020    HDL 58 05/04/2020    HDL 66 06/17/2019        Lab Results   Component Value Date/Time    PROT 7.9 10/24/2020 09:04 AM    PROT 8.3 05/04/2020 11:45 AM    PROT 8.2 06/17/2019 09:12 AM    INR 1.0 05/08/2015 09:25 AM    INR 1.0 06/06/2014 09:15 AM       No results found for: MG      Assessment. Maikel Selby was seen today for facial swelling. Diagnoses and all orders for this visit:    Angioedema, subsequent encounter  -     External Referral To Allergy  -     CBC Auto Differential; Future    Essential hypertension    Depression, unspecified depression type    Pulmonary emphysema, unspecified emphysema type ([facility] [address]    Controlled type 2 diabetes mellitus without complication, without long-term current use of insulin (HCC)  -     Hemoglobin A1C; Future    Chronic obstructive pulmonary disease, unspecified COPD type (HCC)  -     XR CHEST (2 VW);  Future  -     CBC Auto Differential; Future Hyperlipidemia, unspecified hyperlipidemia type  -     Comprehensive Metabolic Panel; Future  -     Lipid Panel; Future  -     TSH without Reflex; Future    Hematoma of right lower leg    Other orders  -     amLODIPine (NORVASC) 5 MG tablet; Take 1 tablet by mouth daily  -     methylPREDNISolone (MEDROL DOSEPACK) 4 MG tablet; Take by mouth. Patient Active Problem List   Diagnosis    COPD [facility]chronic obstructive [facility] disease) ([facility]    DM (diabetes mellitus) ([facility]    Arthrofibrosis of total knee arthroplasty ([facility]       Plan: It appears patient had angioedema it could be side effect of losartan, discontinue losartan  Start on amlodipine 5 mg daily  She has taken lisinopril in the past she has been off that    Recheck blood pressure in couple of months with a new medication  Low-salt diet      For the hematoma on the right leg  Use some heat slowly it should resolve      Refer to Dr. Dameon Monroy for angioedema      Prescription for Medrol Dosepak was given to use it immediately if swelling starts go to ER if shortness of breath or tongue swollen or hoarseness      Depression controlled with medication denies any suicidal ideation      Check CMP lipid profile before next visit        Follow back in 2 months          Return in about 2 months (around 3/13/2021).        Marisela Jordan MD  11:09 AM  1/13/2021     DE

## 2021-01-23 ENCOUNTER — HOSPITAL ENCOUNTER (OUTPATIENT)
Age: 71
Discharge: HOME | End: 2021-01-23
Payer: MEDICARE

## 2021-01-23 LAB
BASOPHILS ABSOLUTE: 0.02 E9/L (ref 0–0.2)
BASOPHILS RELATIVE PERCENT: 0.3 % (ref 0–2)
C3 COMPLEMENT: 135 MG/DL (ref 90–180)
C4 COMPLEMENT: 30 MG/DL (ref 10–40)
EOSINOPHILS ABSOLUTE: 0.2 E9/L (ref 0.05–0.5)
EOSINOPHILS RELATIVE PERCENT: 2.7 % (ref 0–6)
HCT VFR BLD CALC: 40.3 % (ref 34–48)
HEMOGLOBIN: 12.7 G/DL (ref 11.5–15.5)
IMMATURE GRANULOCYTES #: 0.03 E9/L
IMMATURE GRANULOCYTES %: 0.4 % (ref 0–5)
LYMPHOCYTES ABSOLUTE: 2.29 E9/L (ref 1.5–4)
LYMPHOCYTES RELATIVE PERCENT: 30.9 % (ref 20–42)
MCH RBC QN AUTO: 28.1 PG (ref 26–35)
MCHC RBC AUTO-ENTMCNC: 31.5 % (ref 32–34.5)
MCV RBC AUTO: 89.2 FL (ref 80–99.9)
MONOCYTES ABSOLUTE: 0.78 E9/L (ref 0.1–0.95)
MONOCYTES RELATIVE PERCENT: 10.5 % (ref 2–12)
NEUTROPHILS ABSOLUTE: 4.08 E9/L (ref 1.8–7.3)
NEUTROPHILS RELATIVE PERCENT: 55.2 % (ref 43–80)
PDW BLD-RTO: 14.1 FL (ref 11.5–15)
PLATELET # BLD: 175 E9/L (ref 130–450)
PMV BLD AUTO: 9.7 FL (ref 7–12)
RBC # BLD: 4.52 E12/L (ref 3.5–5.5)
WBC # BLD: 7.4 E9/L (ref 4.5–11.5)

## 2021-01-23 PROCEDURE — 86160 COMPLEMENT ANTIGEN: CPT

## 2021-01-23 PROCEDURE — 36415 COLL VENOUS BLD VENIPUNCTURE: CPT

## 2021-01-23 PROCEDURE — 82785 ASSAY OF IGE: CPT

## 2021-01-23 PROCEDURE — 83520 IMMUNOASSAY QUANT NOS NONAB: CPT

## 2021-01-23 PROCEDURE — 86162 COMPLEMENT TOTAL (CH50): CPT

## 2021-01-23 PROCEDURE — 85025 COMPLETE CBC W/AUTO DIFF WBC: CPT

## 2021-01-26 LAB — COMPLEMENT ACTIVITY, TOTAL TURBIDIMETRIC: 50.1 U/ML (ref 38.7–89.9)

## 2021-01-26 RX ORDER — AMLODIPINE BESYLATE 5 MG/1
5 TABLET ORAL DAILY
Qty: 90 TABLET | Refills: 1 | Status: SHIPPED
Start: 2021-01-26 | End: 2021-08-20

## 2021-01-27 LAB
IGE: 417 KU/L
MISCELLANEOUS LAB TEST RESULT: NORMAL

## 2021-03-06 ENCOUNTER — HOSPITAL ENCOUNTER (OUTPATIENT)
Age: 71
Discharge: HOME | End: 2021-03-06
Payer: MEDICARE

## 2021-03-06 DIAGNOSIS — T78.3XXD ANGIOEDEMA, SUBSEQUENT ENCOUNTER: ICD-10-CM

## 2021-03-06 DIAGNOSIS — J44.9 CHRONIC OBSTRUCTIVE PULMONARY DISEASE, UNSPECIFIED COPD TYPE (HCC): ICD-10-CM

## 2021-03-06 DIAGNOSIS — E78.5 HYPERLIPIDEMIA, UNSPECIFIED HYPERLIPIDEMIA TYPE: ICD-10-CM

## 2021-03-06 DIAGNOSIS — E11.9 CONTROLLED TYPE 2 DIABETES MELLITUS WITHOUT COMPLICATION, WITHOUT LONG-TERM CURRENT USE OF INSULIN (HCC): ICD-10-CM

## 2021-03-06 LAB
ALBUMIN SERPL-MCNC: 4.1 G/DL (ref 3.5–5.2)
ALP BLD-CCNC: 80 U/L (ref 35–104)
ALT SERPL-CCNC: 19 U/L (ref 0–32)
ANION GAP SERPL CALCULATED.3IONS-SCNC: 8 MMOL/L (ref 7–16)
AST SERPL-CCNC: 26 U/L (ref 0–31)
BASOPHILS ABSOLUTE: 0.04 E9/L (ref 0–0.2)
BASOPHILS RELATIVE PERCENT: 0.6 % (ref 0–2)
BILIRUB SERPL-MCNC: 0.3 MG/DL (ref 0–1.2)
BUN BLDV-MCNC: 7 MG/DL (ref 8–23)
CALCIUM SERPL-MCNC: 10 MG/DL (ref 8.6–10.2)
CHLORIDE BLD-SCNC: 99 MMOL/L (ref 98–107)
CHOLESTEROL, TOTAL: 137 MG/DL (ref 0–199)
CO2: 31 MMOL/L (ref 22–29)
CREAT SERPL-MCNC: 0.7 MG/DL (ref 0.5–1)
EOSINOPHILS ABSOLUTE: 0.29 E9/L (ref 0.05–0.5)
EOSINOPHILS RELATIVE PERCENT: 4.7 % (ref 0–6)
GFR AFRICAN AMERICAN: >60
GFR NON-AFRICAN AMERICAN: >60 ML/MIN/1.73
GLUCOSE BLD-MCNC: 121 MG/DL (ref 74–99)
HBA1C MFR BLD: 6.6 % (ref 4–5.6)
HCT VFR BLD CALC: 40 % (ref 34–48)
HDLC SERPL-MCNC: 54 MG/DL
HEMOGLOBIN: 12.8 G/DL (ref 11.5–15.5)
IMMATURE GRANULOCYTES #: 0.01 E9/L
IMMATURE GRANULOCYTES %: 0.2 % (ref 0–5)
LDL CHOLESTEROL CALCULATED: 64 MG/DL (ref 0–99)
LYMPHOCYTES ABSOLUTE: 1.7 E9/L (ref 1.5–4)
LYMPHOCYTES RELATIVE PERCENT: 27.6 % (ref 20–42)
MCH RBC QN AUTO: 27.6 PG (ref 26–35)
MCHC RBC AUTO-ENTMCNC: 32 % (ref 32–34.5)
MCV RBC AUTO: 86.2 FL (ref 80–99.9)
MONOCYTES ABSOLUTE: 0.57 E9/L (ref 0.1–0.95)
MONOCYTES RELATIVE PERCENT: 9.3 % (ref 2–12)
NEUTROPHILS ABSOLUTE: 3.55 E9/L (ref 1.8–7.3)
NEUTROPHILS RELATIVE PERCENT: 57.6 % (ref 43–80)
PDW BLD-RTO: 13.9 FL (ref 11.5–15)
PLATELET # BLD: 185 E9/L (ref 130–450)
PMV BLD AUTO: 10.6 FL (ref 7–12)
POTASSIUM SERPL-SCNC: 4.2 MMOL/L (ref 3.5–5)
RBC # BLD: 4.64 E12/L (ref 3.5–5.5)
SODIUM BLD-SCNC: 138 MMOL/L (ref 132–146)
TOTAL PROTEIN: 7.6 G/DL (ref 6.4–8.3)
TRIGL SERPL-MCNC: 96 MG/DL (ref 0–149)
TSH SERPL DL<=0.05 MIU/L-ACNC: 1.82 UIU/ML (ref 0.27–4.2)
VLDLC SERPL CALC-MCNC: 19 MG/DL
WBC # BLD: 6.2 E9/L (ref 4.5–11.5)

## 2021-03-06 PROCEDURE — 84443 ASSAY THYROID STIM HORMONE: CPT

## 2021-03-06 PROCEDURE — 80061 LIPID PANEL: CPT

## 2021-03-06 PROCEDURE — 80053 COMPREHEN METABOLIC PANEL: CPT

## 2021-03-06 PROCEDURE — 83036 HEMOGLOBIN GLYCOSYLATED A1C: CPT

## 2021-03-06 PROCEDURE — 36415 COLL VENOUS BLD VENIPUNCTURE: CPT

## 2021-03-06 PROCEDURE — 85025 COMPLETE CBC W/AUTO DIFF WBC: CPT

## 2021-03-17 ENCOUNTER — OFFICE VISIT (OUTPATIENT)
Dept: FAMILY MEDICINE CLINIC | Age: 71
End: 2021-03-17
Payer: MEDICARE

## 2021-03-17 VITALS
WEIGHT: 165.2 LBS | OXYGEN SATURATION: 97 % | TEMPERATURE: 96.9 F | BODY MASS INDEX: 27.52 KG/M2 | SYSTOLIC BLOOD PRESSURE: 144 MMHG | DIASTOLIC BLOOD PRESSURE: 102 MMHG | HEIGHT: 65 IN | HEART RATE: 111 BPM | RESPIRATION RATE: 16 BRPM

## 2021-03-17 DIAGNOSIS — I10 ESSENTIAL HYPERTENSION: Primary | ICD-10-CM

## 2021-03-17 DIAGNOSIS — E78.5 HYPERLIPIDEMIA, UNSPECIFIED HYPERLIPIDEMIA TYPE: ICD-10-CM

## 2021-03-17 DIAGNOSIS — E11.9 CONTROLLED TYPE 2 DIABETES MELLITUS WITHOUT COMPLICATION, WITHOUT LONG-TERM CURRENT USE OF INSULIN (HCC): ICD-10-CM

## 2021-03-17 DIAGNOSIS — T78.3XXD ANGIOEDEMA, SUBSEQUENT ENCOUNTER: ICD-10-CM

## 2021-03-17 PROCEDURE — 99213 OFFICE O/P EST LOW 20 MIN: CPT | Performed by: INTERNAL MEDICINE

## 2021-03-17 PROCEDURE — 1123F ACP DISCUSS/DSCN MKR DOCD: CPT | Performed by: INTERNAL MEDICINE

## 2021-03-17 PROCEDURE — 2022F DILAT RTA XM EVC RTNOPTHY: CPT | Performed by: INTERNAL MEDICINE

## 2021-03-17 PROCEDURE — 3017F COLORECTAL CA SCREEN DOC REV: CPT | Performed by: INTERNAL MEDICINE

## 2021-03-17 PROCEDURE — 4040F PNEUMOC VAC/ADMIN/RCVD: CPT | Performed by: INTERNAL MEDICINE

## 2021-03-17 PROCEDURE — G8417 CALC BMI ABV UP PARAM F/U: HCPCS | Performed by: INTERNAL MEDICINE

## 2021-03-17 PROCEDURE — 3044F HG A1C LEVEL LT 7.0%: CPT | Performed by: INTERNAL MEDICINE

## 2021-03-17 PROCEDURE — G8427 DOCREV CUR MEDS BY ELIG CLIN: HCPCS | Performed by: INTERNAL MEDICINE

## 2021-03-17 PROCEDURE — G8400 PT W/DXA NO RESULTS DOC: HCPCS | Performed by: INTERNAL MEDICINE

## 2021-03-17 PROCEDURE — 1090F PRES/ABSN URINE INCON ASSESS: CPT | Performed by: INTERNAL MEDICINE

## 2021-03-17 PROCEDURE — G8484 FLU IMMUNIZE NO ADMIN: HCPCS | Performed by: INTERNAL MEDICINE

## 2021-03-17 PROCEDURE — 1036F TOBACCO NON-USER: CPT | Performed by: INTERNAL MEDICINE

## 2021-03-17 RX ORDER — EPINEPHRINE 0.3 MG/.3ML
INJECTION SUBCUTANEOUS
COMMUNITY
Start: 2021-02-10

## 2021-03-17 RX ORDER — TRIAMTERENE AND HYDROCHLOROTHIAZIDE 37.5; 25 MG/1; MG/1
1 TABLET ORAL DAILY
Qty: 30 TABLET | Refills: 3 | Status: SHIPPED
Start: 2021-03-17 | End: 2021-06-21

## 2021-03-17 NOTE — PROGRESS NOTES
Subjective:     Chief Complaint   Patient presents with    Hypertension     pt has has swelling in her face thinking due to meds. she going to see a Allergies Dr for it and has a epipen now. Pt BP is high her dog just passed      Follow-up on hypertension    Still having swelling of the lips    Her ARB's were stopped still 4 days ago she got lip swollen  She is being followed up with the allergist Dr. Zackery Harrington has done extensive blood work she has a follow-up appointment  Denies any swelling of throat or shortness of breath    Depression is controlled        Past Medical History:   Diagnosis Date    Asthma     [facility]     Depression     Diabetes mellitus ([facility]     GERD (gastroesophageal reflux disease)     Hyperlipidemia     Hypertension     Type 2 diabetes mellitus without complication ([facility]         Social History     Socioeconomic History    Marital status:      Spouse name: Not on file    Number of children: Not on file    Years of education: Not on file    Highest education level: Not on file   Occupational History    Not on file   Social Needs    Financial resource strain: Not on file    Food insecurity     Worry: Never true     Inability: Never true    Transportation needs     Medical: No     Non-medical: No   Tobacco Use    Smoking status: Former Smoker     Packs/day: 1.00     Years: 30.00     Pack years: 30.00     Types: Cigarettes     Quit date: 2007     Years since quittin.4    Smokeless tobacco: Former User     Quit date: 2006   Substance and Sexual Activity    Alcohol use:  Yes     Alcohol/week: 2.5 standard drinks     Types: 3 Standard drinks or equivalent per week     Comment: occ    Drug use: No    Sexual activity: Not on file   Lifestyle    Physical activity     Days per week: Not on file     Minutes per session: Not on file    Stress: Not on file   Relationships    Social connections     Talks on phone: Not on file     Gets together: Not on file     Attends Jehovah's witness service: Not on file     Active member of club or organization: Not on file     Attends meetings of clubs or organizations: Not on file     Relationship status: Not on file    Intimate partner violence     Fear of current or ex partner: Not on file     Emotionally abused: Not on file     Physically abused: Not on file     Forced sexual activity: Not on file   Other Topics Concern    Not on file   Social History Narrative    Not on file        Past Surgical History:   Procedure Laterality Date    APPENDECTOMY      BUNIONECTOMY      rt   [address]       states was normal    CARPAL TUNNEL RELEASE      rt hand    HAMMER TOE SURGERY      rt    KNEE JOINT MANIPULATION Right 6/18/15    TOTAL KNEE ARTHROPLASTY  May, 2015    Right    TUBAL LIGATION          Family History   Problem Relation Age of Onset    Diabetes Mother     Kidney Disease Mother     Heart Disease Father     High Blood Pressure Sister     Lung Cancer Sister     High Blood Pressure Brother     Cancer Sister         leukemia    Lung Cancer Son         w/ brain mets  age 55        Allergies   Allergen Reactions    Ace Inhibitors      Cough           ROS  No acute distress  Cardiac: Denies any chest pain or palpitation  Respiratory: Denies any cough or shortness of breath  GI: No abdominal pain. Denies any nausea vomiting or diarrhea colonoscopy 2018 by Dr. Sandrine Aguilar repeat in  per GI  : Denies any dysuria frequency or hematuria  Neuro: No headache or dizziness  Endocrine: hx  diabetes  Skin: normal  No recent weight gain or weight loss  Denies any change in vision    Objective:    BP (!) 144/102   Pulse 111   Temp 96.9 °F (36.1 °C) (Temporal)   Resp 16   Ht 5' 5\" (1.651 m)   Wt 165 lb 3.2 oz (74.9 kg)   SpO2 97%   BMI 27.49 kg/m²     Constitutional: Alert awake and oriented  Eyes: Pupils equal bilaterally.  Extraocular muscles intact  Neck: no GLUCOSE 105 12/22/2017 09:05 AM    GLUCOSE 111 (H) 03/10/2012 08:10 AM    GLUCOSE 114 (H) 05/14/2011 08:00 AM    CALCIUM 10.0 03/06/2021 09:40 AM    CALCIUM 9.8 10/24/2020 09:04 AM    CALCIUM 10.3 (H) 05/04/2020 11:45 AM       Last 3 CMP:    Lab Results   Component Value Date/Time     03/06/2021 09:40 AM     10/24/2020 09:04 AM     05/04/2020 11:45 AM    K 4.2 03/06/2021 09:40 AM    K 4.7 10/24/2020 09:04 AM    K 4.1 05/04/2020 11:45 AM    CL 99 03/06/2021 09:40 AM    CL 97 (L) 10/24/2020 09:04 AM    CL 94 (L) 05/04/2020 11:45 AM    CO2 31 (H) 03/06/2021 09:40 AM    CO2 29 10/24/2020 09:04 AM    CO2 28 05/04/2020 11:45 AM    BUN 7 (L) 03/06/2021 09:40 AM    BUN 12 10/24/2020 09:04 AM    BUN 10 05/04/2020 11:45 AM    CREATININE 0.7 03/06/2021 09:40 AM    CREATININE 0.8 10/24/2020 09:04 AM    CREATININE 0.7 05/04/2020 11:45 AM    GLUCOSE 121 (H) 03/06/2021 09:40 AM    GLUCOSE 121 (H) 05/04/2020 11:45 AM    GLUCOSE 105 12/22/2017 09:05 AM    GLUCOSE 111 (H) 03/10/2012 08:10 AM    GLUCOSE 114 (H) 05/14/2011 08:00 AM    CALCIUM 10.0 03/06/2021 09:40 AM    CALCIUM 9.8 10/24/2020 09:04 AM    CALCIUM 10.3 (H) 05/04/2020 11:45 AM    PROT 7.6 03/06/2021 09:40 AM    PROT 7.9 10/24/2020 09:04 AM    PROT 8.3 05/04/2020 11:45 AM    LABALBU 4.1 03/06/2021 09:40 AM    LABALBU 4.2 10/24/2020 09:04 AM    LABALBU 4.4 05/04/2020 11:45 AM    LABALBU 4.6 03/10/2012 08:10 AM    LABALBU 4.5 05/14/2011 08:00 AM    BILITOT 0.3 03/06/2021 09:40 AM    BILITOT 0.3 10/24/2020 09:04 AM    BILITOT 0.4 05/04/2020 11:45 AM    ALKPHOS 80 03/06/2021 09:40 AM    ALKPHOS 91 10/24/2020 09:04 AM    ALKPHOS 98 05/04/2020 11:45 AM    AST 26 03/06/2021 09:40 AM    AST 35 (H) 10/24/2020 09:04 AM    AST 22 05/04/2020 11:45 AM    ALT 19 03/06/2021 09:40 AM    ALT 31 10/24/2020 09:04 AM    ALT 17 05/04/2020 11:45 AM        CBC:   Lab Results   Component Value Date/Time    WBC 6.2 03/06/2021 09:40 AM    RBC 4.64 03/06/2021 09:40 AM    HGB 12.8 03/06/2021 09:40 AM    HCT 40.0 03/06/2021 09:40 AM    MCV 86.2 03/06/2021 09:40 AM    MCH 27.6 03/06/2021 09:40 AM    MCHC 32.0 03/06/2021 09:40 AM    RDW 13.9 03/06/2021 09:40 AM     03/06/2021 09:40 AM    MPV 10.6 03/06/2021 09:40 AM       A1C:  Lab Results   Component Value Date/Time    LABA1C 6.6 (H) 03/06/2021 09:40 AM       Lipid panel:  Lab Results   Component Value Date    CHOL 137 03/06/2021    CHOL 154 05/04/2020    CHOL 183 01/26/2017    TRIG 96 03/06/2021    TRIG 83 05/04/2020    TRIG 95 01/26/2017    HDL 54 03/06/2021    HDL 63 10/24/2020    HDL 58 05/04/2020        Lab Results   Component Value Date/Time    PROT 7.6 03/06/2021 09:40 AM    PROT 7.9 10/24/2020 09:04 AM    PROT 8.3 05/04/2020 11:45 AM    INR 1.0 05/08/2015 09:25 AM    INR 1.0 06/06/2014 09:15 AM       No results found for: MG      Destiny Chatterjee was seen today for hypertension. Diagnoses and all orders for this visit:    Essential hypertension  -     BASIC METABOLIC PANEL; Future    Controlled type 2 diabetes mellitus without complication, without long-term current use of insulin ([facility])    Hyperlipidemia, unspecified hyperlipidemia type    Angioedema, subsequent encounter    Other orders  -     triamterene-hydroCHLOROthiazide (MAXZIDE-25) 37.5-25 MG per tablet;  Take 1 tablet by mouth daily       Patient Active Problem List   Diagnosis    [facility]ch[facility] obstructive [facility]) [facility]    DM (diabetes mellitus) ([facility]    Arthrofibrosis of total knee arthroplasty ([facility]       Plan: Hypertension not to the goal  Add Maxide 25 mg daily  Continue Norvasc  Her ARB was stopped possible angioedema but she has angioedema again even without ARB  She will discuss with the allergist    Diabetes is controlled A1c 6.6%    Hyperlipidemia doing well cholesterol 137 LDL 64      Depression controlled denies any suicidal ideation    Advised to get mammogram she will discuss with the gynecologist    Return in about 2 months (around 5/17/2021).        Julian Caba MD  1:11 PM  3/17/2021     [place]

## 2021-03-22 RX ORDER — PRAVASTATIN SODIUM 40 MG
TABLET ORAL
Qty: 90 TABLET | Refills: 1 | Status: SHIPPED
Start: 2021-03-22 | End: 2021-09-27 | Stop reason: SDUPTHER

## 2021-03-22 RX ORDER — GLIPIZIDE 5 MG/1
TABLET, FILM COATED, EXTENDED RELEASE ORAL
Qty: 90 TABLET | Refills: 1 | Status: SHIPPED
Start: 2021-03-22 | End: 2021-09-27 | Stop reason: SDUPTHER

## 2021-03-22 RX ORDER — METFORMIN HYDROCHLORIDE 500 MG/1
TABLET, EXTENDED RELEASE ORAL
Qty: 270 TABLET | Refills: 1 | Status: SHIPPED
Start: 2021-03-22 | End: 2021-11-08 | Stop reason: SDUPTHER

## 2021-03-22 RX ORDER — OMEPRAZOLE 40 MG/1
CAPSULE, DELAYED RELEASE ORAL
Qty: 90 CAPSULE | Refills: 1 | Status: SHIPPED
Start: 2021-03-22 | End: 2021-06-21 | Stop reason: SDUPTHER

## 2021-03-22 RX ORDER — CITALOPRAM 20 MG/1
TABLET ORAL
Qty: 90 TABLET | Refills: 1 | Status: SHIPPED
Start: 2021-03-22 | End: 2021-09-27 | Stop reason: SDUPTHER

## 2021-05-11 ENCOUNTER — HOSPITAL ENCOUNTER (OUTPATIENT)
Age: 71
Discharge: HOME | End: 2021-05-11
Payer: MEDICARE

## 2021-05-11 DIAGNOSIS — I10 ESSENTIAL HYPERTENSION: ICD-10-CM

## 2021-05-11 LAB
ANION GAP SERPL CALCULATED.3IONS-SCNC: 9 MMOL/L (ref 7–16)
BUN BLDV-MCNC: 9 MG/DL (ref 6–23)
CALCIUM SERPL-MCNC: 9.8 MG/DL (ref 8.6–10.2)
CHLORIDE BLD-SCNC: 88 MMOL/L (ref 98–107)
CO2: 34 MMOL/L (ref 22–29)
CREAT SERPL-MCNC: 0.7 MG/DL (ref 0.5–1)
GFR AFRICAN AMERICAN: >60
GFR NON-AFRICAN AMERICAN: >60 ML/MIN/1.73
GLUCOSE BLD-MCNC: 101 MG/DL (ref 74–99)
POTASSIUM SERPL-SCNC: 4.1 MMOL/L (ref 3.5–5)
SODIUM BLD-SCNC: 131 MMOL/L (ref 132–146)

## 2021-05-11 PROCEDURE — 80048 BASIC METABOLIC PNL TOTAL CA: CPT

## 2021-05-11 PROCEDURE — 36415 COLL VENOUS BLD VENIPUNCTURE: CPT

## 2021-06-21 ENCOUNTER — OFFICE VISIT (OUTPATIENT)
Dept: FAMILY MEDICINE CLINIC | Age: 71
End: 2021-06-21
Payer: MEDICARE

## 2021-06-21 VITALS
DIASTOLIC BLOOD PRESSURE: 84 MMHG | SYSTOLIC BLOOD PRESSURE: 130 MMHG | WEIGHT: 161 LBS | BODY MASS INDEX: 26.79 KG/M2 | OXYGEN SATURATION: 93 % | HEART RATE: 98 BPM

## 2021-06-21 DIAGNOSIS — T78.3XXD ANGIOEDEMA, SUBSEQUENT ENCOUNTER: ICD-10-CM

## 2021-06-21 DIAGNOSIS — K21.9 GASTROESOPHAGEAL REFLUX DISEASE WITHOUT ESOPHAGITIS: ICD-10-CM

## 2021-06-21 DIAGNOSIS — I10 ESSENTIAL HYPERTENSION: Primary | ICD-10-CM

## 2021-06-21 DIAGNOSIS — J44.9 CHRONIC OBSTRUCTIVE PULMONARY DISEASE, UNSPECIFIED COPD TYPE (HCC): ICD-10-CM

## 2021-06-21 DIAGNOSIS — F32.A DEPRESSION, UNSPECIFIED DEPRESSION TYPE: ICD-10-CM

## 2021-06-21 DIAGNOSIS — E11.9 TYPE 2 DIABETES MELLITUS WITHOUT COMPLICATION, WITHOUT LONG-TERM CURRENT USE OF INSULIN (HCC): ICD-10-CM

## 2021-06-21 DIAGNOSIS — E78.5 HYPERLIPIDEMIA, UNSPECIFIED HYPERLIPIDEMIA TYPE: ICD-10-CM

## 2021-06-21 DIAGNOSIS — E87.1 HYPONATREMIA: ICD-10-CM

## 2021-06-21 PROBLEM — T78.3XXA ANGIO-EDEMA: Status: ACTIVE | Noted: 2021-06-21

## 2021-06-21 PROCEDURE — 1036F TOBACCO NON-USER: CPT | Performed by: INTERNAL MEDICINE

## 2021-06-21 PROCEDURE — 3017F COLORECTAL CA SCREEN DOC REV: CPT | Performed by: INTERNAL MEDICINE

## 2021-06-21 PROCEDURE — G8417 CALC BMI ABV UP PARAM F/U: HCPCS | Performed by: INTERNAL MEDICINE

## 2021-06-21 PROCEDURE — G8400 PT W/DXA NO RESULTS DOC: HCPCS | Performed by: INTERNAL MEDICINE

## 2021-06-21 PROCEDURE — 2022F DILAT RTA XM EVC RTNOPTHY: CPT | Performed by: INTERNAL MEDICINE

## 2021-06-21 PROCEDURE — 4040F PNEUMOC VAC/ADMIN/RCVD: CPT | Performed by: INTERNAL MEDICINE

## 2021-06-21 PROCEDURE — 1123F ACP DISCUSS/DSCN MKR DOCD: CPT | Performed by: INTERNAL MEDICINE

## 2021-06-21 PROCEDURE — 99213 OFFICE O/P EST LOW 20 MIN: CPT | Performed by: INTERNAL MEDICINE

## 2021-06-21 PROCEDURE — 3023F SPIROM DOC REV: CPT | Performed by: INTERNAL MEDICINE

## 2021-06-21 PROCEDURE — G8427 DOCREV CUR MEDS BY ELIG CLIN: HCPCS | Performed by: INTERNAL MEDICINE

## 2021-06-21 PROCEDURE — 3044F HG A1C LEVEL LT 7.0%: CPT | Performed by: INTERNAL MEDICINE

## 2021-06-21 PROCEDURE — 1090F PRES/ABSN URINE INCON ASSESS: CPT | Performed by: INTERNAL MEDICINE

## 2021-06-21 PROCEDURE — G8926 SPIRO NO PERF OR DOC: HCPCS | Performed by: INTERNAL MEDICINE

## 2021-06-21 RX ORDER — TRIAMTERENE AND HYDROCHLOROTHIAZIDE 37.5; 25 MG/1; MG/1
0.5 TABLET ORAL DAILY
COMMUNITY
End: 2021-11-08 | Stop reason: SDUPTHER

## 2021-06-21 RX ORDER — OMEPRAZOLE 40 MG/1
40 CAPSULE, DELAYED RELEASE ORAL DAILY
Qty: 90 CAPSULE | Refills: 1 | Status: SHIPPED
Start: 2021-06-21 | End: 2022-01-07

## 2021-06-21 NOTE — PROGRESS NOTES
Subjective:     Chief Complaint   Patient presents with    3 Month Follow-Up        Past Medical History:   Diagnosis Date    Asthma     COPD (chronic obstructive pulmonary disease) ([facility]     Depression     Diabetes mellitus ([facility]     GERD (gastroesophageal reflux disease)     Hyperlipidemia     Hypertension     Type 2 diabetes mellitus without complication ([facility]         Social History     Socioeconomic History    Marital status:      Spouse name: Not on file    Number of children: Not on file    Years of education: Not on file    Highest education level: Not on file   Occupational History    Not on file   Tobacco Use    Smoking status: Former Smoker     Packs/day: 1.00     Years: 30.00     Pack years: 30.00     Types: Cigarettes     Quit date: 2007     Years since quittin.7    Smokeless tobacco: Former User     Quit date: 2006   Substance and Sexual Activity    Alcohol use: Yes     Alcohol/week: 2.5 standard drinks     Types: 3 Standard drinks or equivalent per week     Comment: occ    Drug use: No    Sexual activity: Not on file   Other Topics Concern    Not on file   Social History Narrative    Not on file     Social Determinants of Health     Financial Resource Strain:     Difficulty of Paying Living Expenses:    Food Insecurity: No Food Insecurity    Worried About Running Out of Food in the Last Year: Never true    Yo of Food in the Last Year: Never true   Transportation Needs: No Transportation Needs    Lack of Transportation (Medical): No    Lack of Transportation (Non-Medical):  No   Physical Activity:     Days of Exercise per Week:     Minutes of Exercise per Session:    Stress:     Feeling of Stress :    Social Connections:     Frequency of Communication with Friends and Family:     Frequency of Social Gatherings with Friends and Family:     Attends [employer] Services:     Active Member of Clubs or Organizations:     Attends Club or Organization Meetings:     Marital Status:    Intimate Partner Violence:     Fear of Current or Ex-Partner:     Emotionally Abused:     Physically Abused:     Sexually Abused:         Past Surgical History:   Procedure Laterality Date    APPENDECTOMY      BUNIONECTOMY      rt    CARDIAC CATHETERIZATION       states was normal    CARPAL TUNNEL RELEASE      rt hand    HAMMER TOE SURGERY      rt    KNEE JOINT MANIPULATION Right 6/18/15    TOTAL KNEE ARTHROPLASTY  May, 2015    Right    TUBAL LIGATION          Family History   Problem Relation Age of Onset    Diabetes Mother     Kidney Disease Mother     Heart Disease Father     High Blood Pressure Sister     Lung Cancer Sister     High Blood Pressure Brother     Cancer Sister         leukemia    Lung Cancer Son         w/ brain mets  age 55        Allergies   Allergen Reactions    Ace Inhibitors      Cough           ROS  No acute distress  Cardiac: Denies any chest pain or palpitation  Mows the lawn, walks 2 miles without any angina,  Respiratory: Denies any cough or shortness of breath  Chest x-ray 2021 -  GI: No abdominal pain. Denies any nausea vomiting or diarrhea  Colonoscopy 2018 Dr. Morteza Nieto  Repeat in 23  : Denies any dysuria frequency or hematuria  Neuro: No headache or dizziness  Endocrine: No diabetes  Skin: normal  No recent weight gain or weight loss  Denies any change in vision    Objective:    /84   Pulse 98   Wt 161 lb (73 kg)   SpO2 93%   BMI 26.79 kg/m²     Constitutional: Alert awake and oriented  Eyes: Pupils equal bilaterally. Extraocular muscles intact  Neck: no JVD adenopathy no bruit  Heart:  RRR, no murmurs, gallops, or rubs.   Lungs:    no wheeze, rales or rhonchi  Abd: bowel sounds present, nontender, nondistended, no masses  Extrem:  No clubbing, cyanosis, or edema  Neuro: AAOx3,No Focal deficit  Psychological: no depression or anxiety       Current Outpatient Medications   Medication Sig Dispense Refill    omeprazole (PRILOSEC) 40 MG delayed release capsule Take 1 capsule by mouth daily 90 capsule 1    triamterene-hydroCHLOROthiazide (MAXZIDE-25) 37.5-25 MG per tablet Take 0.5 tablets by mouth daily      pravastatin (PRAVACHOL) 40 MG tablet TAKE 1 TABLET DAILY 90 tablet 1    citalopram (CELEXA) 20 MG tablet TAKE 1 TABLET ONCE DAILY 90 tablet 1    metFORMIN (GLUCOPHAGE-XR) 500 MG extended release tablet TAKE 1 TABLET EVERY MORNINGAND 2 TABLETS EVERY EVENING 270 tablet 1    glipiZIDE (GLUCOTROL XL) 5 MG extended release tablet TAKE 1 TABLET EVERY MORNING 90 tablet 1    EPINEPHrine (EPIPEN) 0.3 MG/0.3ML SOAJ injection inject as directed if needed FOR ANAPHYLAXIS ONCE      amLODIPine (NORVASC) 5 MG tablet Take 1 tablet by mouth daily 90 tablet 1    aspirin 81 MG tablet Take 81 mg by mouth every evening       No current facility-administered medications for this visit.         Last 3 BMP  Lab Results   Component Value Date/Time     (L) 05/11/2021 12:27 PM     03/06/2021 09:40 AM     10/24/2020 09:04 AM    K 4.1 05/11/2021 12:27 PM    K 4.2 03/06/2021 09:40 AM    K 4.7 10/24/2020 09:04 AM    CL 88 (L) 05/11/2021 12:27 PM    CL 99 03/06/2021 09:40 AM    CL 97 (L) 10/24/2020 09:04 AM    CO2 34 (H) 05/11/2021 12:27 PM    CO2 31 (H) 03/06/2021 09:40 AM    CO2 29 10/24/2020 09:04 AM    BUN 9 05/11/2021 12:27 PM    BUN 7 (L) 03/06/2021 09:40 AM    BUN 12 10/24/2020 09:04 AM    CREATININE 0.7 05/11/2021 12:27 PM    CREATININE 0.7 03/06/2021 09:40 AM    CREATININE 0.8 10/24/2020 09:04 AM    GLUCOSE 101 (H) 05/11/2021 12:27 PM    GLUCOSE 121 (H) 03/06/2021 09:40 AM    GLUCOSE 121 (H) 05/04/2020 11:45 AM    GLUCOSE 111 (H) 03/10/2012 08:10 AM    GLUCOSE 114 (H) 05/14/2011 08:00 AM    CALCIUM 9.8 05/11/2021 12:27 PM    CALCIUM 10.0 03/06/2021 09:40 AM    CALCIUM 9.8 10/24/2020 09:04 AM       Last 3 CMP:    Lab Results   Component Value Date/Time     (L) 05/11/2021 12:27 PM     03/06/2021 09:40 AM     10/24/2020 09:04 AM    K 4.1 05/11/2021 12:27 PM    K 4.2 03/06/2021 09:40 AM    K 4.7 10/24/2020 09:04 AM    CL 88 (L) 05/11/2021 12:27 PM    CL 99 03/06/2021 09:40 AM    CL 97 (L) 10/24/2020 09:04 AM    CO2 34 (H) 05/11/2021 12:27 PM    CO2 31 (H) 03/06/2021 09:40 AM    CO2 29 10/24/2020 09:04 AM    BUN 9 05/11/2021 12:27 PM    BUN 7 (L) 03/06/2021 09:40 AM    BUN 12 10/24/2020 09:04 AM    CREATININE 0.7 05/11/2021 12:27 PM    CREATININE 0.7 03/06/2021 09:40 AM    CREATININE 0.8 10/24/2020 09:04 AM    GLUCOSE 101 (H) 05/11/2021 12:27 PM    GLUCOSE 121 (H) 03/06/2021 09:40 AM    GLUCOSE 121 (H) 05/04/2020 11:45 AM    GLUCOSE 111 (H) 03/10/2012 08:10 AM    GLUCOSE 114 (H) 05/14/2011 08:00 AM    CALCIUM 9.8 05/11/2021 12:27 PM    CALCIUM 10.0 03/06/2021 09:40 AM    CALCIUM 9.8 10/24/2020 09:04 AM    PROT 7.6 03/06/2021 09:40 AM    PROT 7.9 10/24/2020 09:04 AM    PROT 8.3 05/04/2020 11:45 AM    LABALBU 4.1 03/06/2021 09:40 AM    LABALBU 4.2 10/24/2020 09:04 AM    LABALBU 4.4 05/04/2020 11:45 AM    LABALBU 4.6 03/10/2012 08:10 AM    LABALBU 4.5 05/14/2011 08:00 AM    BILITOT 0.3 03/06/2021 09:40 AM    BILITOT 0.3 10/24/2020 09:04 AM    BILITOT 0.4 05/04/2020 11:45 AM    ALKPHOS 80 03/06/2021 09:40 AM    ALKPHOS 91 10/24/2020 09:04 AM    ALKPHOS 98 05/04/2020 11:45 AM    AST 26 03/06/2021 09:40 AM    AST 35 (H) 10/24/2020 09:04 AM    AST 22 05/04/2020 11:45 AM    ALT 19 03/06/2021 09:40 AM    ALT 31 10/24/2020 09:04 AM    ALT 17 05/04/2020 11:45 AM        CBC:   Lab Results   Component Value Date/Time    WBC 6.2 03/06/2021 09:40 AM    RBC 4.64 03/06/2021 09:40 AM    HGB 12.8 03/06/2021 09:40 AM    HCT 40.0 03/06/2021 09:40 AM    MCV 86.2 03/06/2021 09:40 AM    MCH 27.6 03/06/2021 09:40 AM    MCHC 32.0 03/06/2021 09:40 AM    RDW 13.9 03/06/2021 09:40 AM     03/06/2021 09:40 AM    MPV 10.6 03/06/2021 09:40 AM       A1C:  Lab Results   Component Value Date/Time    LABA1C 6.6 (H) 03/06/2021 09:40 AM       Lipid panel:  Lab Results   Component Value Date    CHOL 137 03/06/2021    CHOL 154 05/04/2020    CHOL 183 01/26/2017    TRIG 96 03/06/2021    TRIG 83 05/04/2020    TRIG 95 01/26/2017    HDL 54 03/06/2021    HDL 63 10/24/2020    HDL 58 05/04/2020        Lab Results   Component Value Date/Time    PROT 7.6 03/06/2021 09:40 AM    PROT 7.9 10/24/2020 09:04 AM    PROT 8.3 05/04/2020 11:45 AM    INR 1.0 05/08/2015 09:25 AM    INR 1.0 06/06/2014 09:15 AM       No results found for: MG      Assessment. Madhu Ken was seen today for 3 month follow-up. Diagnoses and all orders for this visit:    Essential hypertension  -     COMPREHENSIVE METABOLIC PANEL; Future    Hyperlipidemia, unspecified hyperlipidemia type  -     COMPREHENSIVE METABOLIC PANEL; Future  -     LIPID PANEL; Future    Type 2 diabetes mellitus without complication, without long-term current use of insulin (HCC)  -     HEMOGLOBIN A1C; Future    Angioedema, subsequent encounter    Hyponatremia    Chronic obstructive pulmonary disease, unspecified COPD type ([address]    Gastroesophageal reflux disease without esophagitis    Depression, unspecified depression type    Other orders  -     omeprazole (PRILOSEC) 40 MG delayed release capsule;  Take 1 capsule by mouth daily       Patient Active Problem List   Diagnosis    COPD (chronic obstructive pulmonary disease) ([address]    DM (diabetes mellitus) ([address]    Arthrofibrosis [facility] total knee [facility]    Hyperlipidemia    Essential hypertension    Angio-edema    Chronic obstructive pulmonary disease (HCC)       Plan: Hypertension controlled continue amlodipine  Decrease Maxide to half tablet due to hyponatremia    Hyperlipidemia continue pravastatin cholesterol 137 LDL 64    Diabetes type 2 controlled A1c 6.6% watch diet continue Metformin and Glucotrol      Angioedema improved since off the ARBs she was referred to allergist she tried twice could not see him, she does not want to see right now because she is feeling better she will call if any problem      Hyponatremia possibly diuretics reduce Maxide to half tablet check BMP      Lifestyle diet modification discussed    COPD stable chest x-ray negative    GE reflux disease no dysphagia continue omeprazole    Depression controlled    Return in about 3 months (around 9/21/2021).        Li Aldridge MD  1:14 PM  6/21/2021     [place]

## 2021-08-20 RX ORDER — AMLODIPINE BESYLATE 5 MG/1
TABLET ORAL
Qty: 90 TABLET | Refills: 1 | Status: SHIPPED
Start: 2021-08-20 | End: 2022-01-07

## 2021-08-20 RX ORDER — AMLODIPINE BESYLATE 5 MG/1
5 TABLET ORAL DAILY
Qty: 90 TABLET | Refills: 1 | OUTPATIENT
Start: 2021-08-20

## 2021-09-21 ENCOUNTER — HOSPITAL ENCOUNTER (OUTPATIENT)
Age: 71
Discharge: HOME | End: 2021-09-21
Payer: MEDICARE

## 2021-09-21 DIAGNOSIS — E11.9 TYPE 2 DIABETES MELLITUS WITHOUT COMPLICATION, WITHOUT LONG-TERM CURRENT USE OF INSULIN (HCC): ICD-10-CM

## 2021-09-21 DIAGNOSIS — I10 ESSENTIAL HYPERTENSION: ICD-10-CM

## 2021-09-21 DIAGNOSIS — E78.5 HYPERLIPIDEMIA, UNSPECIFIED HYPERLIPIDEMIA TYPE: ICD-10-CM

## 2021-09-21 LAB
ALBUMIN SERPL-MCNC: 4.3 G/DL (ref 3.5–5.2)
ALP BLD-CCNC: 85 U/L (ref 35–104)
ALT SERPL-CCNC: 23 U/L (ref 0–32)
ANION GAP SERPL CALCULATED.3IONS-SCNC: 8 MMOL/L (ref 7–16)
AST SERPL-CCNC: 24 U/L (ref 0–31)
BILIRUB SERPL-MCNC: 0.3 MG/DL (ref 0–1.2)
BUN BLDV-MCNC: 8 MG/DL (ref 6–23)
CALCIUM SERPL-MCNC: 10 MG/DL (ref 8.6–10.2)
CHLORIDE BLD-SCNC: 102 MMOL/L (ref 98–107)
CHOLESTEROL, TOTAL: 154 MG/DL (ref 0–199)
CO2: 33 MMOL/L (ref 22–29)
CREAT SERPL-MCNC: 0.8 MG/DL (ref 0.5–1)
GFR AFRICAN AMERICAN: >60
GFR NON-AFRICAN AMERICAN: >60 ML/MIN/1.73
GLUCOSE BLD-MCNC: 132 MG/DL (ref 74–99)
HBA1C MFR BLD: 6.4 % (ref 4–5.6)
HDLC SERPL-MCNC: 54 MG/DL
LDL CHOLESTEROL CALCULATED: 77 MG/DL (ref 0–99)
POTASSIUM SERPL-SCNC: 4.4 MMOL/L (ref 3.5–5)
SODIUM BLD-SCNC: 143 MMOL/L (ref 132–146)
TOTAL PROTEIN: 7.7 G/DL (ref 6.4–8.3)
TRIGL SERPL-MCNC: 113 MG/DL (ref 0–149)
VLDLC SERPL CALC-MCNC: 23 MG/DL

## 2021-09-21 PROCEDURE — 80053 COMPREHEN METABOLIC PANEL: CPT

## 2021-09-21 PROCEDURE — 36415 COLL VENOUS BLD VENIPUNCTURE: CPT

## 2021-09-21 PROCEDURE — 83036 HEMOGLOBIN GLYCOSYLATED A1C: CPT

## 2021-09-21 PROCEDURE — 80061 LIPID PANEL: CPT

## 2021-09-27 ENCOUNTER — OFFICE VISIT (OUTPATIENT)
Dept: FAMILY MEDICINE CLINIC | Age: 71
End: 2021-09-27
Payer: MEDICARE

## 2021-09-27 VITALS
TEMPERATURE: 97.7 F | DIASTOLIC BLOOD PRESSURE: 80 MMHG | OXYGEN SATURATION: 95 % | HEART RATE: 90 BPM | SYSTOLIC BLOOD PRESSURE: 126 MMHG | BODY MASS INDEX: 27.12 KG/M2 | WEIGHT: 163 LBS

## 2021-09-27 DIAGNOSIS — J44.9 CHRONIC OBSTRUCTIVE PULMONARY DISEASE, UNSPECIFIED COPD TYPE (HCC): ICD-10-CM

## 2021-09-27 DIAGNOSIS — I10 ESSENTIAL HYPERTENSION: Primary | ICD-10-CM

## 2021-09-27 DIAGNOSIS — F32.A DEPRESSION, UNSPECIFIED DEPRESSION TYPE: ICD-10-CM

## 2021-09-27 DIAGNOSIS — E87.1 HYPONATREMIA: ICD-10-CM

## 2021-09-27 DIAGNOSIS — E11.9 TYPE 2 DIABETES MELLITUS WITHOUT COMPLICATION, WITHOUT LONG-TERM CURRENT USE OF INSULIN (HCC): ICD-10-CM

## 2021-09-27 PROCEDURE — G8400 PT W/DXA NO RESULTS DOC: HCPCS | Performed by: INTERNAL MEDICINE

## 2021-09-27 PROCEDURE — 3017F COLORECTAL CA SCREEN DOC REV: CPT | Performed by: INTERNAL MEDICINE

## 2021-09-27 PROCEDURE — 3044F HG A1C LEVEL LT 7.0%: CPT | Performed by: INTERNAL MEDICINE

## 2021-09-27 PROCEDURE — 1123F ACP DISCUSS/DSCN MKR DOCD: CPT | Performed by: INTERNAL MEDICINE

## 2021-09-27 PROCEDURE — 99214 OFFICE O/P EST MOD 30 MIN: CPT | Performed by: INTERNAL MEDICINE

## 2021-09-27 PROCEDURE — G8417 CALC BMI ABV UP PARAM F/U: HCPCS | Performed by: INTERNAL MEDICINE

## 2021-09-27 PROCEDURE — G8427 DOCREV CUR MEDS BY ELIG CLIN: HCPCS | Performed by: INTERNAL MEDICINE

## 2021-09-27 PROCEDURE — 4040F PNEUMOC VAC/ADMIN/RCVD: CPT | Performed by: INTERNAL MEDICINE

## 2021-09-27 PROCEDURE — 1036F TOBACCO NON-USER: CPT | Performed by: INTERNAL MEDICINE

## 2021-09-27 PROCEDURE — 2022F DILAT RTA XM EVC RTNOPTHY: CPT | Performed by: INTERNAL MEDICINE

## 2021-09-27 PROCEDURE — G8926 SPIRO NO PERF OR DOC: HCPCS | Performed by: INTERNAL MEDICINE

## 2021-09-27 PROCEDURE — 3023F SPIROM DOC REV: CPT | Performed by: INTERNAL MEDICINE

## 2021-09-27 PROCEDURE — 1090F PRES/ABSN URINE INCON ASSESS: CPT | Performed by: INTERNAL MEDICINE

## 2021-09-27 RX ORDER — PRAVASTATIN SODIUM 40 MG
40 TABLET ORAL DAILY
Qty: 90 TABLET | Refills: 1 | Status: SHIPPED
Start: 2021-09-27 | End: 2022-01-07

## 2021-09-27 RX ORDER — CITALOPRAM 20 MG/1
20 TABLET ORAL DAILY
Qty: 90 TABLET | Refills: 1 | Status: SHIPPED
Start: 2021-09-27 | End: 2022-01-07

## 2021-09-27 RX ORDER — GLIPIZIDE 5 MG/1
5 TABLET, FILM COATED, EXTENDED RELEASE ORAL DAILY
Qty: 90 TABLET | Refills: 1 | Status: SHIPPED
Start: 2021-09-27 | End: 2022-01-07

## 2021-09-27 NOTE — PROGRESS NOTES
Family:     Frequency of Social Gatherings with Friends and Family:     Attends Christian Services:     Active Member of Clubs or Organizations:     Attends Club or Organization Meetings:     Marital Status:    Intimate Partner Violence:     Fear of Current or Ex-Partner:     Emotionally Abused:     Physically Abused:     Sexually Abused:         Past Surgical History:   Procedure Laterality Date    APPENDECTOMY      BUNIONECTOMY      rt    CARDIAC CATHETERIZATION       states was normal    CARPAL TUNNEL RELEASE      rt hand    HAMMER TOE SURGERY      rt    KNEE JOINT MANIPULATION Right 6/18/15    TOTAL KNEE ARTHROPLASTY  May, 2015    Right    TUBAL LIGATION          Family History   Problem Relation Age of Onset    Diabetes Mother     Kidney Disease Mother     Heart Disease Father     High Blood Pressure Sister     Lung Cancer Sister     High Blood Pressure Brother     Cancer Sister         leukemia    Lung Cancer Son         w/ brain mets  age 55        Allergies   Allergen Reactions    Ace Inhibitors      Cough           ROS  No acute distress  Cardiac: Denies any chest pain or palpitation  Cardiac cath in  with Dr. Elian Damon negative  She is very active mows the lawn walks 2 miles without difficulty  Respiratory: Denies any cough or shortness of breath  No chronic cough no night sweats chest x-ray 2021 -ve  GI: No abdominal pain. Denies any nausea vomiting or diarrhea  Colonoscopy  with Dr. Ilsa Lima repeat in 5 years  : Denies any dysuria frequency or hematuria  Neuro: No headache or dizziness  Endocrine: No diabetes  Skin: normal  No recent weight gain or weight loss  Denies any change in vision    Objective:    /80   Pulse 90   Temp 97.7 °F (36.5 °C)   Wt 163 lb (73.9 kg)   SpO2 95%   BMI 27.12 kg/m²     Constitutional: Alert awake and oriented  Eyes: Pupils equal bilaterally.  Extraocular muscles intact  Neck: no JVD adenopathy no bruit  Heart:  RRR, no murmurs, gallops, or rubs. Lungs:    no wheeze, rales or rhonchi  Abd: bowel sounds present, nontender, nondistended, no masses  Extrem:  No clubbing, cyanosis, or edema  Neuro: AAOx3,No Focal deficit  Psychological: no depression or anxiety       Current Outpatient Medications   Medication Sig Dispense Refill    glipiZIDE (GLUCOTROL XL) 5 MG extended release tablet Take 1 tablet by mouth daily 90 tablet 1    citalopram (CELEXA) 20 MG tablet Take 1 tablet by mouth daily 90 tablet 1    pravastatin (PRAVACHOL) 40 MG tablet Take 1 tablet by mouth daily 90 tablet 1    amLODIPine (NORVASC) 5 MG tablet TAKE 1 TABLET DAILY 90 tablet 1    omeprazole (PRILOSEC) 40 MG delayed release capsule Take 1 capsule by mouth daily 90 capsule 1    triamterene-hydroCHLOROthiazide (MAXZIDE-25) 37.5-25 MG per tablet Take 0.5 tablets by mouth daily      metFORMIN (GLUCOPHAGE-XR) 500 MG extended release tablet TAKE 1 TABLET EVERY MORNINGAND 2 TABLETS EVERY EVENING 270 tablet 1    EPINEPHrine (EPIPEN) 0.3 MG/0.3ML SOAJ injection inject as directed if needed FOR ANAPHYLAXIS ONCE      aspirin 81 MG tablet Take 81 mg by mouth every evening       No current facility-administered medications for this visit.         Last 3 BMP  Lab Results   Component Value Date/Time     09/21/2021 08:45 AM     (L) 05/11/2021 12:27 PM     03/06/2021 09:40 AM    K 4.4 09/21/2021 08:45 AM    K 4.1 05/11/2021 12:27 PM    K 4.2 03/06/2021 09:40 AM     09/21/2021 08:45 AM    CL 88 (L) 05/11/2021 12:27 PM    CL 99 03/06/2021 09:40 AM    CO2 33 (H) 09/21/2021 08:45 AM    CO2 34 (H) 05/11/2021 12:27 PM    CO2 31 (H) 03/06/2021 09:40 AM    BUN 8 09/21/2021 08:45 AM    BUN 9 05/11/2021 12:27 PM    BUN 7 (L) 03/06/2021 09:40 AM    CREATININE 0.8 09/21/2021 08:45 AM    CREATININE 0.7 05/11/2021 12:27 PM    CREATININE 0.7 03/06/2021 09:40 AM    GLUCOSE 132 (H) 09/21/2021 08:45 AM    GLUCOSE 101 (H) 05/11/2021 12:27 PM    GLUCOSE 121 (H) 03/06/2021 09:40 AM    GLUCOSE 111 (H) 03/10/2012 08:10 AM    GLUCOSE 114 (H) 05/14/2011 08:00 AM    CALCIUM 10.0 09/21/2021 08:45 AM    CALCIUM 9.8 05/11/2021 12:27 PM    CALCIUM 10.0 03/06/2021 09:40 AM       Last 3 CMP:    Lab Results   Component Value Date/Time     09/21/2021 08:45 AM     (L) 05/11/2021 12:27 PM     03/06/2021 09:40 AM    K 4.4 09/21/2021 08:45 AM    K 4.1 05/11/2021 12:27 PM    K 4.2 03/06/2021 09:40 AM     09/21/2021 08:45 AM    CL 88 (L) 05/11/2021 12:27 PM    CL 99 03/06/2021 09:40 AM    CO2 33 (H) 09/21/2021 08:45 AM    CO2 34 (H) 05/11/2021 12:27 PM    CO2 31 (H) 03/06/2021 09:40 AM    BUN 8 09/21/2021 08:45 AM    BUN 9 05/11/2021 12:27 PM    BUN 7 (L) 03/06/2021 09:40 AM    CREATININE 0.8 09/21/2021 08:45 AM    CREATININE 0.7 05/11/2021 12:27 PM    CREATININE 0.7 03/06/2021 09:40 AM    GLUCOSE 132 (H) 09/21/2021 08:45 AM    GLUCOSE 101 (H) 05/11/2021 12:27 PM    GLUCOSE 121 (H) 03/06/2021 09:40 AM    GLUCOSE 111 (H) 03/10/2012 08:10 AM    GLUCOSE 114 (H) 05/14/2011 08:00 AM    CALCIUM 10.0 09/21/2021 08:45 AM    CALCIUM 9.8 05/11/2021 12:27 PM    CALCIUM 10.0 03/06/2021 09:40 AM    PROT 7.7 09/21/2021 08:45 AM    PROT 7.6 03/06/2021 09:40 AM    PROT 7.9 10/24/2020 09:04 AM    LABALBU 4.3 09/21/2021 08:45 AM    LABALBU 4.1 03/06/2021 09:40 AM    LABALBU 4.2 10/24/2020 09:04 AM    LABALBU 4.6 03/10/2012 08:10 AM    LABALBU 4.5 05/14/2011 08:00 AM    BILITOT 0.3 09/21/2021 08:45 AM    BILITOT 0.3 03/06/2021 09:40 AM    BILITOT 0.3 10/24/2020 09:04 AM    ALKPHOS 85 09/21/2021 08:45 AM    ALKPHOS 80 03/06/2021 09:40 AM    ALKPHOS 91 10/24/2020 09:04 AM    AST 24 09/21/2021 08:45 AM    AST 26 03/06/2021 09:40 AM    AST 35 (H) 10/24/2020 09:04 AM    ALT 23 09/21/2021 08:45 AM    ALT 19 03/06/2021 09:40 AM    ALT 31 10/24/2020 09:04 AM        CBC:   Lab Results   Component Value Date/Time    WBC 6.2 03/06/2021 09:40 AM    RBC 4.64 03/06/2021 09:40 AM    HGB 12.8 03/06/2021 09:40 AM    HCT 40.0 03/06/2021 09:40 AM    MCV 86.2 03/06/2021 09:40 AM    MCH 27.6 03/06/2021 09:40 AM    MCHC 32.0 03/06/2021 09:40 AM    RDW 13.9 03/06/2021 09:40 AM     03/06/2021 09:40 AM    MPV 10.6 03/06/2021 09:40 AM       A1C:  Lab Results   Component Value Date/Time    LABA1C 6.4 (H) 09/21/2021 08:45 AM       Lipid panel:  Lab Results   Component Value Date    CHOL 154 09/21/2021    CHOL 137 03/06/2021    CHOL 154 05/04/2020    TRIG 113 09/21/2021    TRIG 96 03/06/2021    TRIG 83 05/04/2020    HDL 54 09/21/2021    HDL 54 03/06/2021    HDL 63 10/24/2020        Lab Results   Component Value Date/Time    PROT 7.7 09/21/2021 08:45 AM    PROT 7.6 03/06/2021 09:40 AM    PROT 7.9 10/24/2020 09:04 AM    INR 1.0 05/08/2015 09:25 AM    INR 1.0 06/06/2014 09:15 AM       No results found for: MG      AssessmentCheri Miller was seen today for hypertension, diabetes and hyperlipidemia. Diagnoses and all orders for this visit:    Essential hypertension    Chronic obstructive pulmonary disease, unspecified COPD type ([facility]    Type 2 diabetes mellitus without complication, without long-term current use of insulin ([facility])    Depression, unspecified depression type    Hyponatremia    Other orders  -     glipiZIDE (GLUCOTROL XL) 5 MG extended release tablet; Take 1 tablet by mouth daily  -     citalopram (CELEXA) 20 MG tablet; Take 1 tablet by mouth daily  -     pravastatin (PRAVACHOL) 40 MG tablet;  Take 1 tablet by mouth daily       Patient Active Problem List   Diagnosis    COPD (chronic obstructive pulmonary disease) ([facility]    DM (diabetes mellitus) ([facility]    Arthrofibrosis of total knee arthroplasty ([facility]    Hyperlipidemia    Essential hypertension    Angio-edema    Chronic obstructive pulmonary disease ([facility]    Depression    Hyponatremia       Plan: Labs reviewed A1c 6.4%  Diabetes type 2 controlled continue Glucotrol XL 5 mg, Metformin 500,    COPD stable smoker in the past denies any cough no chronic cough no dyspnea no orthopnea    Hypertension very good control, she had possible angioedema with ARB use she has been seen by allergist denies any symptoms    Hyponatremia improved since Maxide has been cut down    GE reflux disease stable    Hyperlipidemia cholesterol 154 LDL 77      She had both a Covid shot the last one was August 20, 2021  She is going to [facility] for her granddaughter wedding      Return in about 4 months (around 1/27/2022).        Jamarcus Chawla MD  12:00 PM  9/27/2021     [place]

## 2021-10-07 ENCOUNTER — CARE COORDINATION (OUTPATIENT)
Dept: CARE COORDINATION | Age: 71
End: 2021-10-07

## 2021-11-08 RX ORDER — METFORMIN HYDROCHLORIDE 500 MG/1
500 TABLET, EXTENDED RELEASE ORAL 3 TIMES DAILY
Qty: 270 TABLET | Refills: 1 | Status: SHIPPED
Start: 2021-11-08 | End: 2022-01-18

## 2021-11-08 RX ORDER — TRIAMTERENE AND HYDROCHLOROTHIAZIDE 37.5; 25 MG/1; MG/1
0.5 TABLET ORAL DAILY
Qty: 45 TABLET | Refills: 1 | Status: SHIPPED | OUTPATIENT
Start: 2021-11-08 | End: 2022-06-30 | Stop reason: ALTCHOICE

## 2022-01-10 RX ORDER — CITALOPRAM 20 MG/1
TABLET ORAL
Qty: 90 TABLET | Refills: 0 | Status: SHIPPED | OUTPATIENT
Start: 2022-01-10 | End: 2022-03-31 | Stop reason: SDUPTHER

## 2022-01-10 RX ORDER — AMLODIPINE BESYLATE 5 MG/1
TABLET ORAL
Qty: 90 TABLET | Refills: 0 | Status: SHIPPED | OUTPATIENT
Start: 2022-01-10 | End: 2022-03-31 | Stop reason: SDUPTHER

## 2022-01-10 RX ORDER — GLIPIZIDE 5 MG/1
TABLET, EXTENDED RELEASE ORAL
Qty: 90 TABLET | Refills: 0 | Status: SHIPPED | OUTPATIENT
Start: 2022-01-10 | End: 2022-03-31 | Stop reason: SDUPTHER

## 2022-01-10 RX ORDER — PRAVASTATIN SODIUM 40 MG
TABLET ORAL
Qty: 90 TABLET | Refills: 0 | Status: SHIPPED | OUTPATIENT
Start: 2022-01-10 | End: 2022-03-31 | Stop reason: SDUPTHER

## 2022-01-10 RX ORDER — OMEPRAZOLE 40 MG/1
CAPSULE, DELAYED RELEASE ORAL
Qty: 90 CAPSULE | Refills: 0 | Status: SHIPPED | OUTPATIENT
Start: 2022-01-10 | End: 2022-03-31 | Stop reason: SDUPTHER

## 2022-02-01 ENCOUNTER — OFFICE VISIT (OUTPATIENT)
Dept: FAMILY MEDICINE CLINIC | Age: 72
End: 2022-02-01
Payer: MEDICARE

## 2022-02-01 VITALS
DIASTOLIC BLOOD PRESSURE: 80 MMHG | OXYGEN SATURATION: 98 % | HEIGHT: 65 IN | BODY MASS INDEX: 27.32 KG/M2 | HEART RATE: 92 BPM | SYSTOLIC BLOOD PRESSURE: 132 MMHG | RESPIRATION RATE: 16 BRPM | WEIGHT: 164 LBS | TEMPERATURE: 98.4 F

## 2022-02-01 DIAGNOSIS — E11.69 ONYCHOMYCOSIS OF MULTIPLE TOENAILS WITH TYPE 2 DIABETES MELLITUS (HCC): ICD-10-CM

## 2022-02-01 DIAGNOSIS — F32.5 MAJOR DEPRESSIVE DISORDER WITH SINGLE EPISODE, IN FULL REMISSION (HCC): ICD-10-CM

## 2022-02-01 DIAGNOSIS — Z87.891 PERSONAL HISTORY OF TOBACCO USE: ICD-10-CM

## 2022-02-01 DIAGNOSIS — B35.1 ONYCHOMYCOSIS OF MULTIPLE TOENAILS WITH TYPE 2 DIABETES MELLITUS (HCC): ICD-10-CM

## 2022-02-01 DIAGNOSIS — I10 ESSENTIAL HYPERTENSION: ICD-10-CM

## 2022-02-01 DIAGNOSIS — E11.9 TYPE 2 DIABETES MELLITUS WITHOUT COMPLICATION, WITHOUT LONG-TERM CURRENT USE OF INSULIN (HCC): Primary | ICD-10-CM

## 2022-02-01 DIAGNOSIS — E78.2 MIXED HYPERLIPIDEMIA: ICD-10-CM

## 2022-02-01 DIAGNOSIS — J44.9 CHRONIC OBSTRUCTIVE PULMONARY DISEASE, UNSPECIFIED COPD TYPE (HCC): ICD-10-CM

## 2022-02-01 PROBLEM — E87.1 HYPONATREMIA: Status: RESOLVED | Noted: 2021-09-27 | Resolved: 2022-02-01

## 2022-02-01 LAB
CHP ED QC CHECK: NORMAL
CREATININE URINE POCT: NORMAL
GLUCOSE BLD-MCNC: 99 MG/DL
HBA1C MFR BLD: 6.2 %
MICROALBUMIN/CREAT 24H UR: NORMAL MG/G{CREAT}
MICROALBUMIN/CREAT UR-RTO: NORMAL

## 2022-02-01 PROCEDURE — 1090F PRES/ABSN URINE INCON ASSESS: CPT | Performed by: NURSE PRACTITIONER

## 2022-02-01 PROCEDURE — G8484 FLU IMMUNIZE NO ADMIN: HCPCS | Performed by: NURSE PRACTITIONER

## 2022-02-01 PROCEDURE — 3044F HG A1C LEVEL LT 7.0%: CPT | Performed by: NURSE PRACTITIONER

## 2022-02-01 PROCEDURE — G0296 VISIT TO DETERM LDCT ELIG: HCPCS | Performed by: NURSE PRACTITIONER

## 2022-02-01 PROCEDURE — 82044 UR ALBUMIN SEMIQUANTITATIVE: CPT | Performed by: NURSE PRACTITIONER

## 2022-02-01 PROCEDURE — 83036 HEMOGLOBIN GLYCOSYLATED A1C: CPT | Performed by: NURSE PRACTITIONER

## 2022-02-01 PROCEDURE — 1036F TOBACCO NON-USER: CPT | Performed by: NURSE PRACTITIONER

## 2022-02-01 PROCEDURE — 82962 GLUCOSE BLOOD TEST: CPT | Performed by: NURSE PRACTITIONER

## 2022-02-01 PROCEDURE — 4040F PNEUMOC VAC/ADMIN/RCVD: CPT | Performed by: NURSE PRACTITIONER

## 2022-02-01 PROCEDURE — 1123F ACP DISCUSS/DSCN MKR DOCD: CPT | Performed by: NURSE PRACTITIONER

## 2022-02-01 PROCEDURE — 2022F DILAT RTA XM EVC RTNOPTHY: CPT | Performed by: NURSE PRACTITIONER

## 2022-02-01 PROCEDURE — 3023F SPIROM DOC REV: CPT | Performed by: NURSE PRACTITIONER

## 2022-02-01 PROCEDURE — 99214 OFFICE O/P EST MOD 30 MIN: CPT | Performed by: NURSE PRACTITIONER

## 2022-02-01 PROCEDURE — G8427 DOCREV CUR MEDS BY ELIG CLIN: HCPCS | Performed by: NURSE PRACTITIONER

## 2022-02-01 PROCEDURE — G8417 CALC BMI ABV UP PARAM F/U: HCPCS | Performed by: NURSE PRACTITIONER

## 2022-02-01 PROCEDURE — 3017F COLORECTAL CA SCREEN DOC REV: CPT | Performed by: NURSE PRACTITIONER

## 2022-02-01 PROCEDURE — G8400 PT W/DXA NO RESULTS DOC: HCPCS | Performed by: NURSE PRACTITIONER

## 2022-02-01 RX ORDER — METFORMIN HYDROCHLORIDE 500 MG/1
500 TABLET, EXTENDED RELEASE ORAL
Qty: 90 TABLET | Refills: 0 | Status: SHIPPED
Start: 2022-02-01 | End: 2022-03-31 | Stop reason: SDUPTHER

## 2022-02-01 SDOH — ECONOMIC STABILITY: FOOD INSECURITY: WITHIN THE PAST 12 MONTHS, THE FOOD YOU BOUGHT JUST DIDN'T LAST AND YOU DIDN'T HAVE MONEY TO GET MORE.: NEVER TRUE

## 2022-02-01 SDOH — ECONOMIC STABILITY: HOUSING INSECURITY: IN THE LAST 12 MONTHS, HOW MANY PLACES HAVE YOU LIVED?: 1

## 2022-02-01 SDOH — ECONOMIC STABILITY: HOUSING INSECURITY
IN THE LAST 12 MONTHS, WAS THERE A TIME WHEN YOU DID NOT HAVE A STEADY PLACE TO SLEEP OR SLEPT IN A SHELTER (INCLUDING NOW)?: NO

## 2022-02-01 SDOH — ECONOMIC STABILITY: TRANSPORTATION INSECURITY
IN THE PAST 12 MONTHS, HAS THE LACK OF TRANSPORTATION KEPT YOU FROM MEDICAL APPOINTMENTS OR FROM GETTING MEDICATIONS?: NO

## 2022-02-01 SDOH — ECONOMIC STABILITY: INCOME INSECURITY: IN THE LAST 12 MONTHS, WAS THERE A TIME WHEN YOU WERE NOT ABLE TO PAY THE MORTGAGE OR RENT ON TIME?: NO

## 2022-02-01 SDOH — ECONOMIC STABILITY: FOOD INSECURITY: WITHIN THE PAST 12 MONTHS, YOU WORRIED THAT YOUR FOOD WOULD RUN OUT BEFORE YOU GOT MONEY TO BUY MORE.: NEVER TRUE

## 2022-02-01 ASSESSMENT — PATIENT HEALTH QUESTIONNAIRE - PHQ9
5. POOR APPETITE OR OVEREATING: 0
SUM OF ALL RESPONSES TO PHQ QUESTIONS 1-9: 0
10. IF YOU CHECKED OFF ANY PROBLEMS, HOW DIFFICULT HAVE THESE PROBLEMS MADE IT FOR YOU TO DO YOUR WORK, TAKE CARE OF THINGS AT HOME, OR GET ALONG WITH OTHER PEOPLE: 0
4. FEELING TIRED OR HAVING LITTLE ENERGY: 0
7. TROUBLE CONCENTRATING ON THINGS, SUCH AS READING THE NEWSPAPER OR WATCHING TELEVISION: 0
3. TROUBLE FALLING OR STAYING ASLEEP: 0
SUM OF ALL RESPONSES TO PHQ QUESTIONS 1-9: 0
1. LITTLE INTEREST OR PLEASURE IN DOING THINGS: 0
SUM OF ALL RESPONSES TO PHQ9 QUESTIONS 1 & 2: 0
SUM OF ALL RESPONSES TO PHQ QUESTIONS 1-9: 0
2. FEELING DOWN, DEPRESSED OR HOPELESS: 0
8. MOVING OR SPEAKING SO SLOWLY THAT OTHER PEOPLE COULD HAVE NOTICED. OR THE OPPOSITE, BEING SO FIGETY OR RESTLESS THAT YOU HAVE BEEN MOVING AROUND A LOT MORE THAN USUAL: 0
9. THOUGHTS THAT YOU WOULD BE BETTER OFF DEAD, OR OF HURTING YOURSELF: 0
SUM OF ALL RESPONSES TO PHQ QUESTIONS 1-9: 0
6. FEELING BAD ABOUT YOURSELF - OR THAT YOU ARE A FAILURE OR HAVE LET YOURSELF OR YOUR FAMILY DOWN: 0

## 2022-02-01 ASSESSMENT — ENCOUNTER SYMPTOMS
TROUBLE SWALLOWING: 0
CHEST TIGHTNESS: 0
SHORTNESS OF BREATH: 0
RHINORRHEA: 0
DIARRHEA: 0
ABDOMINAL PAIN: 0
BACK PAIN: 0
SORE THROAT: 0
NAUSEA: 0
SINUS PRESSURE: 0
COLOR CHANGE: 0
SINUS PAIN: 0
FACIAL SWELLING: 0
VOMITING: 0
COUGH: 0
CONSTIPATION: 0
WHEEZING: 0
VOICE CHANGE: 0

## 2022-02-01 ASSESSMENT — LIFESTYLE VARIABLES
HOW OFTEN DO YOU HAVE A DRINK CONTAINING ALCOHOL: NEVER
HOW MANY STANDARD DRINKS CONTAINING ALCOHOL DO YOU HAVE ON A TYPICAL DAY: 1 OR 2

## 2022-02-01 ASSESSMENT — SOCIAL DETERMINANTS OF HEALTH (SDOH): HOW HARD IS IT FOR YOU TO PAY FOR THE VERY BASICS LIKE FOOD, HOUSING, MEDICAL CARE, AND HEATING?: NOT HARD AT ALL

## 2022-02-01 NOTE — ASSESSMENT & PLAN NOTE
New Well-controlled, continue current medications, continue current treatment plan, medication adherence emphasized and lifestyle modifications recommended

## 2022-02-01 NOTE — PATIENT INSTRUCTIONS
What is lung cancer screening? Lung cancer screening is a way in which doctors check the lungs for early signs of cancer in people who have no symptoms of lung cancer. A low-dose CT scan uses much less radiation than a normal CT scan and shows a more detailed image of the lungs than a standard X-ray. The goal of lung cancer screening is to find cancer early, before it has a chance to grow, spread, or cause problems. One large study found that smokers who were screened with low-dose CT scans were less likely to die of lung cancer than those who were screened with standard X-ray. Below is a summary of the things you need to know regarding screening for lung cancer with low-dose computed tomography (LDCT). This is a screening program that involves routine annual screening with LDCT studies of the lung. The LDCTs are done using low-dose radiation that is not thought to increase your cancer risk. If you have other serious medical conditions (other cancers, congestive heart failure) that limit your life expectancy to less than 10 years, you should not undergo lung cancer screening with LDCT. The chance is 20%-60% that the LDCT result will show abnormalities. This would require additional testing which could include repeat imaging or even invasive procedures. Most (about 95%) of \"abnormal\" LDCT results are false in the sense that no lung cancer is ultimately found. Additionally, some (about 10%) of the cancers found would not affect your life expectancy, even if undetected and untreated. If you are still smoking, the single most important thing that you can do to reduce your risk of dying of lung cancer is to quit. For this screening to be covered by Medicare and most other insurers, strict criteria must be met. If you do not meet these criteria, but still wish to undergo LDCT testing, you will be required to sign a waiver indicating your willingness to pay for the scan.   Patient Education Learning About COPD  What is COPD? COPD is a lung disease that makes it hard to breathe. COPD stands for chronic obstructive pulmonary disease. It is caused by damage to the lungs over many years, usually from smoking. Other things that may put you at risk for COPD include breathing chemical fumes, dust, or air pollution over a long period of time. Secondhand smoke is also bad. Chronic bronchitis and emphysema are two lung problems that are types of COPD. In chronic bronchitis, the airways that carry air to the lungs (bronchial tubes) get inflamed and make a lot of mucus. This can narrow or block the airways, making it hard for you to breathe. It can also make you cough. In emphysema, the air sacs in your lungs are damaged and lose their stretch. Less air gets in and out of your lungs, which makes you feel short of breath. What happens when you have COPD? COPD gradually gets worse over time. As it gets worse, you may be short of breath even when you do things like get dressed, fix a meal, or eat. People often feel weaker and limit activities. And some people may get lung infections and heart problems. What are the symptoms? The main symptoms are:  · A cough that will not go away. · Mucus that comes up when you cough. · Shortness of breath that gets worse with activity. At times, your symptoms may suddenly flare up and get much worse. This is a called a COPD exacerbation (say \"egg-CARINA-elsie-BAY-akosua\"). When this happens, your usual symptoms quickly get worse and stay bad. This can be dangerous. You may have to go to the hospital.  How can you keep COPD from getting worse? Not smoking is the best way to keep COPD from getting worse. If you need help quitting, talk to your doctor about stop-smoking programs and medicines. Also, be sure to get your flu, pneumococcal, and whooping cough (pertussis) vaccines. And avoid air pollution, fumes, and dust as much as you can. How is COPD treated?   COPD may be treated with medicines and oxygen, along with self-care. · Medicines called bronchodilators are used to open or relax your airways. They can help you breathe easier. There are two types:  ? Short-acting bronchodilators ease your symptoms. They are considered a good first choice for treating stable COPD in a person whose symptoms come and go (intermittent symptoms). ? Long-acting bronchodilators help prevent breathing problems. They help people whose symptoms do not go away (persistent symptoms). · Oxygen therapy boosts the amount of oxygen in your blood and helps you breathe easier. · Self-care means the things you can do for yourself to help manage your COPD. They are things like:  ? Quitting smoking. ? Eating well.  ? Staying active. ? Avoiding colds, infections, and other things that may trigger your symptoms. ? Staying current on vaccines. A lung (pulmonary) rehab program can help you learn to manage your disease. This program teaches you how to breathe easier, exercise, and eat well. Follow-up care is a key part of your treatment and safety. Be sure to make and go to all appointments, and call your doctor if you are having problems. It's also a good idea to know your test results and keep a list of the medicines you take. Where can you learn more? Go to https://[employer] Remember. org and sign in to your [employer] account. Enter V314 in the [facility] box to learn more about \"Learning About COPD. \"     If you do not have an account, please click on the \"Sign Up Now\" link. Current as of: July 6, 2021               Content Version: 13.1  © 2006-2021 [employer]. Care instructions adapted under license by [facility] [place] ([facility]). If you have questions about a medical condition or this instruction, always ask your healthcare professional. [address] any warranty or liability for your use of this information.

## 2022-02-01 NOTE — ASSESSMENT & PLAN NOTE
New   Low Dose CT (LDCT) Lung Screening criteria met:     Age 55-77(Medicare) or 50-80 ([facility])   Pack year smoking >30 (Medicare) or >20 ([facility])   Still smoking or less than 15 year since quit   No sign or symptoms of lung cancer   > 11 months since last LDCT     Risks and benefits of lung cancer screening with LDCT scans discussed:    Significance of positive screen - False-positive LDCT results often occur. 95% of all positive results do not lead to a diagnosis of cancer. Usually further imaging can resolve most false-positive results; however, some patients may require invasive procedures. Over diagnosis risk - 10% to 12% of screen-detected lung cancer cases are over diagnosed--that is, the cancer would not have been detected in the patient's lifetime without the screening. Need for follow up screens annually to continue lung cancer screening effectiveness     Risks associated with radiation from annual LDCT- Radiation exposure is about the same as for a mammogram, which is about 1/3 of the annual background radiation exposure from everyday life. Starting screening at age 54 is not likely to increase cancer risk from radiation exposure. Patients with comorbidities resulting in life expectancy of < 10 years, or that would preclude treatment of an abnormality identified on CT, should not be screened due to lack of benefit.     To obtain maximal benefit from this screening, smoking cessation and long-term abstinence from smoking is critical

## 2022-02-01 NOTE — PROGRESS NOTES
OFFICE PROGRESS NOTE  [address]  [address] [place] [address] [id number]  Dept: [phone]   Chief Complaint   Patient presents with   Romeo Rondon Doctor     pt was last seen 9/27/2021 Dr Brandie Mclaughlin        ASSESSMENT/PLAN   1. Type 2 diabetes mellitus without complication, without long-term current use of insulin ([facility])  Assessment & Plan:  New Well-controlled, continue current medications, continue current treatment plan, medication adherence emphasized and lifestyle modifications recommended  Orders:  -     POCT glycosylated hemoglobin (Hb A1C)  -     POCT Glucose  -     HM DIABETES FOOT EXAM  -     POCT microalbumin  -     CBC Auto Differential; Future  -     Comprehensive Metabolic Panel; Future  -     Lipid Panel; Future  -     metFORMIN (GLUCOPHAGE-XR) 500 MG extended release tablet; Take 1 tablet by mouth 3 times daily (with meals), Disp-90 tablet, R-0Normal  2. Essential hypertension  Assessment & Plan:  New Well-controlled, continue current medications, continue current treatment plan, medication adherence emphasized and lifestyle modifications recommended  Orders:  -     CBC Auto Differential; Future  -     Comprehensive Metabolic Panel; Future  -     Lipid Panel; Future  3. Chronic obstructive pulmonary disease, unspecified COPD type ([facility])  4. Mixed hyperlipidemia  Assessment & Plan:   Well-controlled, continue current medications, continue current treatment plan, medication adherence emphasized and lifestyle modifications recommended  Orders:  -     CBC Auto Differential; Future  -     Comprehensive Metabolic Panel; Future  -     Lipid Panel; Future  5. Onychomycosis of multiple toenails with type 2 diabetes mellitus ([facility])  -     Ambulatory referral to Podiatry  6.  Major depressive disorder with single episode, in full remission [facility])  Assessment & Plan:  New Well-controlled, continue current medications, continue current treatment plan, medication medicare well visit. HPI:   Here to establish care was seeing DR Benji Arguello. She has history of angioedema does carry epi pen. She did see Dr Mane Robledo and had testing but nothing indicated why she had it and will periodically get again for no apparent reason. 80 y.o. female presents today for follow-up of diabetes [place]. In-office bloodsugar is:   Results for POC orders placed in visit on 02/01/22   POCT microalbumin   Result Value Ref Range    Microalb, Ur 10 mg/l     Creatinine Ur POCT 50 mg/dl     Microalbumin Creatinine Ratio <30 mg/g    POCT glycosylated hemoglobin (Hb A1C)   Result Value Ref Range    Hemoglobin A1C 6.2 %   POCT Glucose   Result Value Ref Range    Glucose 99 mg/dL    QC OK? Patient reports being compliant to low carbohydrate diet and increasing weekly exercises. Currently, the patient is treated with medication(s): Glipizide XL 5 mg daily, metformin  mg TID. Patient reports not  checking home blood sugar. Patient denies hypoglycemic episodes and understand to take sweetened beverages or a snack if hypoglycemic symptoms are suspected. Hypertension: Patient here for follow-up of elevated blood pressure. She is exercising and is adherent to low salt diet. Blood pressure is well controlled at home. Cardiac symptoms none. Patient denies chest pain, chest pressure/discomfort, claudication, dyspnea, exertional chest pressure/discomfort, fatigue, irregular heart beat, lower extremity edema, near-syncope, orthopnea, palpitations, paroxysmal nocturnal dyspnea, syncope and tachypnea. Cardiovascular risk factors: advanced age (older than 54 for men, 72 for women), diabetes mellitus, dyslipidemia, hypertension and smoking/ tobacco exposure. Use of agents associated with hypertension: none. History of target organ damage: none. Hyperlipidemia: Patient presents with hyperlipidemia. She was tested because DM, HTN, hyperlipidemia.   Her last labs  9/21/21 showed Total cholesterol of 154, HDL 54, LDL 77,  Triglycerides 113. She denies chest pain, dyspnea, exertional chest pressure/discomfort, fatigue, feeding intolerance, lower extremity edema, palpitations, poor exercise tolerance, syncope, tachypnea and skin xanthelasma. There is not a family history of hyperlipidemia. There is a family history of early ischemia heart disease. Depression: Patient complains of depression. She complains of no symptoms at this time. Onset was approximately several years ago, stable since that time. She denies current suicidal and homicidal plan or intent. Family history significant for no psychiatric illness. Possible organic causes contributing are: none. Risk factors: previous episode of depression Previous treatment includes Celexa  She complains of the following side effects from the treatment: none. She has COPD/Asthma diagnosed years ago. She denies any symptoms and doesn't use her inhaler. She quit smoking 15 years ago and hasn't had low dose lung cancer screening. Her son passed away from lung cancer which had metastasized. She isn't sure if she would treat or not depending on how if it was caught early.        Today's vital signs are as follows:  /80   Pulse 92   Temp 98.4 °F (36.9 °C)   Resp 16   Ht 5' 5\" (1.651 m)   Wt 164 lb (74.4 kg)   SpO2 98%   BMI 27.29 kg/m²     Lab Results   Component Value Date    LABA1C 6.2 02/01/2022     Pain scale:0/10        Current Outpatient Medications:     metFORMIN (GLUCOPHAGE-XR) 500 MG extended release tablet, Take 1 tablet by mouth 3 times daily (with meals), Disp: 90 tablet, Rfl: 0    omeprazole (PRILOSEC) 40 MG delayed release capsule, TAKE 1 CAPSULE DAILY, Disp: 90 capsule, Rfl: 0    pravastatin (PRAVACHOL) 40 MG tablet, TAKE 1 TABLET DAILY, Disp: 90 tablet, Rfl: 0    GLIPIZIDE XL 5 MG extended release tablet, TAKE 1 TABLET DAILY, Disp: 90 tablet, Rfl: 0    citalopram (CELEXA) 20 MG tablet, TAKE 1 TABLET DAILY, Disp: 90 tablet, Rfl: 0    amLODIPine (NORVASC) 5 MG tablet, TAKE 1 TABLET DAILY, Disp: 90 tablet, Rfl: 0    triamterene-hydroCHLOROthiazide (MAXZIDE-25) 37.5-25 MG per tablet, Take 0.5 tablets by mouth daily, Disp: 45 tablet, Rfl: 1    EPINEPHrine (EPIPEN) 0.3 MG/0.3ML SOAJ injection, inject as directed if needed FOR ANAPHYLAXIS ONCE, Disp: , Rfl:     aspirin 81 MG tablet, Take 81 mg by mouth every evening, Disp: , Rfl:       Surgical History:  has a past surgical history that includes Appendectomy; Carpal tunnel release; Tubal ligation; Bunionectomy; Hammer toe surgery; Cardiac catheterization; Total knee arthroplasty (May, 2015); and knee joint manipulation (Right, 6/18/15). Social History:  reports that she quit smoking about 14 years ago. Her smoking use included cigarettes. She has a 30.00 pack-year smoking history. She quit smokeless tobacco use about 15 years ago. She reports current alcohol use of about 2.5 standard drinks of alcohol per week. She reports that she does not use drugs. Family History: family history includes COPD in her paternal grandfather; Cancer in her brother and sister; Diabetes in her mother; Heart Attack in her father and paternal grandmother; Heart Disease (age of onset: 76) in her father; High Blood Pressure in her brother and sister; Kidney Disease in her mother; Maxcine Japanese in her maternal grandmother; Marzetta Shock in her sister and son; Pneumonia in her maternal grandfather; Substance Abuse in her son. I have reviewed Cornelia's allergies, medications, problem list, medical, social and family history and have updated as needed in the electronic medical record    Review of Systems   Constitutional: Negative for activity change, appetite change, chills, diaphoresis, fatigue, fever and unexpected weight change.    HENT: Negative for congestion, dental problem, drooling, ear discharge, ear pain, facial swelling, hearing loss, mouth sores, nosebleeds, postnasal drip, rhinorrhea, sinus pressure, sinus pain, sneezing, sore throat, tinnitus, trouble swallowing and voice change. Eyes: Negative for visual disturbance. Respiratory: Negative for cough, chest tightness, shortness of breath and wheezing. Cardiovascular: Negative for chest pain, palpitations and leg swelling. Gastrointestinal: Negative for abdominal pain, constipation, diarrhea, nausea and vomiting. Endocrine: Negative for cold intolerance, heat intolerance, polydipsia, polyphagia and polyuria. Genitourinary: Negative for difficulty urinating, frequency and urgency. Musculoskeletal: Negative for arthralgias, back pain, gait problem, joint swelling, myalgias, neck pain and neck stiffness. Skin: Negative for color change, pallor, rash and wound. Allergic/Immunologic: Negative for environmental allergies, food allergies and immunocompromised state. Neurological: Negative for dizziness, tremors, seizures, syncope, facial asymmetry, speech difficulty, weakness, light-headedness, numbness and headaches. Hematological: Negative for adenopathy. Does not bruise/bleed easily. Psychiatric/Behavioral: Negative for agitation, behavioral problems, confusion, decreased concentration, dysphoric mood, hallucinations, self-injury, sleep disturbance and suicidal ideas. The patient is not nervous/anxious and is not hyperactive.         OBJECTIVE:     VS:  Wt Readings from Last 3 Encounters:   02/01/22 164 lb (74.4 kg)   09/27/21 163 lb (73.9 kg)   06/21/21 161 lb (73 kg)                       Vitals:    02/01/22 1040   BP: 132/80   Pulse: 92   Resp: 16   Temp: 98.4 °F (36.9 °C)   SpO2: 98%   Weight: 164 lb (74.4 kg)   Height: 5' 5\" (1.651 m)       General: Alert and oriented to person, place, and time, well developed and well nourished, in no acute distress  SKIN: Warm and dry, intact without any rash, masses or lesions  HEAD: normocephalic, atraumatic  Eyes: sclera/conjunctiva clear, PERRLA, EOMI's intact  ENT: tympanic membranes, external ear and ear canal normal bilaterally, normal hearing, Nose without deformity, nasal mucosa and turbinates normal without polyps   Throat: clear, tongue midline, tonsils 1+, drainage, no masses or lesions noted, good dentition  Neck: supple and non-tender without mass, trachea midline, no cervical lymphadenopathy, no bruit, no thyromegaly or nodules  Cardiovascular: regular rate and regular rhythm, normal S1 and S2,  no murmurs, rubs, clicks, or gallop. Distal pulses intact, no carotid bruits. No edema  Pulmonary/Chest: clear to auscultation bilaterally, no wheezes, rales or rhonchi, normal air movement, no respiratory distress  Abdomen: soft, non-tender, non-distended, normal bowel sounds, no masses or hepatosplenomegaly  Musculoskeletal: Normal ROM, no joint swelling, deformity or tenderness   Neurologic: reflexes normal and symmetric, no cranial nerve deficit, gait, coordination and speech normal  Extremities: no clubbing, cyanosis, or edema. Psychiatric: Good eye contact, normal mood and affect, answers questions appropriately  Foot Exam:  No signs of infection and/or necrosis noted. Sensation intact and equal bilaterally. Monofilament normal bilateral,  Interdigit spaces exhibit no abnormal skin changes and/or surface growth. Thickened great toe nails with hammer toe and bunion noted. I have reviewed my findings and recommendations with Sandra Jimenes.     Rob Guerrero, APRN - CNP, NP-C, FNP-BC

## 2022-02-10 ENCOUNTER — OFFICE VISIT (OUTPATIENT)
Dept: PODIATRY | Age: 72
End: 2022-02-10
Payer: MEDICARE

## 2022-02-10 VITALS — HEIGHT: 67 IN | BODY MASS INDEX: 25.74 KG/M2 | WEIGHT: 164 LBS

## 2022-02-10 DIAGNOSIS — M79.675 PAIN OF TOE OF LEFT FOOT: ICD-10-CM

## 2022-02-10 DIAGNOSIS — E11.51 TYPE II DIABETES MELLITUS WITH PERIPHERAL CIRCULATORY DISORDER (HCC): ICD-10-CM

## 2022-02-10 DIAGNOSIS — M79.674 PAIN OF TOE OF RIGHT FOOT: ICD-10-CM

## 2022-02-10 DIAGNOSIS — B35.1 ONYCHOMYCOSIS: Primary | ICD-10-CM

## 2022-02-10 DIAGNOSIS — I87.2 VENOUS INSUFFICIENCY (CHRONIC) (PERIPHERAL): ICD-10-CM

## 2022-02-10 PROCEDURE — G8427 DOCREV CUR MEDS BY ELIG CLIN: HCPCS | Performed by: PODIATRIST

## 2022-02-10 PROCEDURE — 1036F TOBACCO NON-USER: CPT | Performed by: PODIATRIST

## 2022-02-10 PROCEDURE — 3017F COLORECTAL CA SCREEN DOC REV: CPT | Performed by: PODIATRIST

## 2022-02-10 PROCEDURE — 4040F PNEUMOC VAC/ADMIN/RCVD: CPT | Performed by: PODIATRIST

## 2022-02-10 PROCEDURE — 99203 OFFICE O/P NEW LOW 30 MIN: CPT | Performed by: PODIATRIST

## 2022-02-10 PROCEDURE — 3044F HG A1C LEVEL LT 7.0%: CPT | Performed by: PODIATRIST

## 2022-02-10 PROCEDURE — G8400 PT W/DXA NO RESULTS DOC: HCPCS | Performed by: PODIATRIST

## 2022-02-10 PROCEDURE — 1123F ACP DISCUSS/DSCN MKR DOCD: CPT | Performed by: PODIATRIST

## 2022-02-10 PROCEDURE — 11721 DEBRIDE NAIL 6 OR MORE: CPT | Performed by: PODIATRIST

## 2022-02-10 PROCEDURE — G8484 FLU IMMUNIZE NO ADMIN: HCPCS | Performed by: PODIATRIST

## 2022-02-10 PROCEDURE — G8417 CALC BMI ABV UP PARAM F/U: HCPCS | Performed by: PODIATRIST

## 2022-02-10 PROCEDURE — 1090F PRES/ABSN URINE INCON ASSESS: CPT | Performed by: PODIATRIST

## 2022-02-10 PROCEDURE — 2022F DILAT RTA XM EVC RTNOPTHY: CPT | Performed by: PODIATRIST

## 2022-02-10 NOTE — LETTER
[address] [place] [address] [place]   Phone: [phone]  Fax: Derrick Reyes [place]     Leena Chu  [id number]   1950  2/10/2022      Dear Markie Mcdonough,    I would like to thank you for the kind referral of Leena Chu. She presented to the office today for evaluation regarding chronic nail dystrophy issues bilateral foot. Mycotic nail debridement was performed on today's visit to patient tolerance. We did discuss additional diabetic foot care techniques with patient today to prevent potential lower extremity issues from occurring. We will have continued diabetic foot care evaluation and treatment in the future. If you should have any questions concerning her visit today, please do not hesitate to contact me.     Sincerely,    Ulises Cleveland DPM

## 2022-02-10 NOTE — PROGRESS NOTES
2/10/22     Nash Sánchez    : 1950 Sex: female   Age: 80 y.o. Patient was referred by: Martin Tyler  Patient's PCP/Provider is:  LOLY Garcia - CNP    Subjective:    Patient seen today for evaluation regarding chronic nail dystrophy issues multiple digital nails bilateral foot. Chief Complaint   Patient presents with    Nail Problem     nail fungus-bilat great toes       HPI: Patient stated she has a hard time trimming her nails due to arthritic issues present in her hands. Patient has been having some issues with her shoe gear due to nail dystrophy present. She denies any additional issues at this time. ROS:  Const: Positives and pertinent negatives as per HPI. Musculo: Denies symptoms other than stated above. Neuro: Denies symptoms other than stated above. Skin: Denies symptoms other than stated above. Current Medications:    Current Outpatient Medications:     metFORMIN (GLUCOPHAGE-XR) 500 MG extended release tablet, Take 1 tablet by mouth 3 times daily (with meals), Disp: 90 tablet, Rfl: 0    omeprazole (PRILOSEC) 40 MG delayed release capsule, TAKE 1 CAPSULE DAILY, Disp: 90 capsule, Rfl: 0    pravastatin (PRAVACHOL) 40 MG tablet, TAKE 1 TABLET DAILY, Disp: 90 tablet, Rfl: 0    GLIPIZIDE XL 5 MG extended release tablet, TAKE 1 TABLET DAILY, Disp: 90 tablet, Rfl: 0    citalopram (CELEXA) 20 MG tablet, TAKE 1 TABLET DAILY, Disp: 90 tablet, Rfl: 0    amLODIPine (NORVASC) 5 MG tablet, TAKE 1 TABLET DAILY, Disp: 90 tablet, Rfl: 0    EPINEPHrine (EPIPEN) 0.3 MG/0.3ML SOAJ injection, inject as directed if needed FOR ANAPHYLAXIS ONCE, Disp: , Rfl:     aspirin 81 MG tablet, Take 81 mg by mouth every evening, Disp: , Rfl:     triamterene-hydroCHLOROthiazide (MAXZIDE-25) 37.5-25 MG per tablet, Take 0.5 tablets by mouth daily, Disp: 45 tablet, Rfl: 1    Allergies:   Allergies   Allergen Reactions    Ace Inhibitors      Cough          Vitals:    02/10/22 8476 Weight: 164 lb (74.4 kg)   Height: 5' 7\" (1.702 m)        Past Medical History:   Diagnosis Date    Allergic rhinitis     Asthma     Chronic back pain     HURT BACK IN     [facility] [address] [facility]     Depression     Diabetes mellitus (HCC)     GERD (gastroesophageal reflux disease)     Hyperlipidemia     Hypertension     Osteoarthritis     Type 2 diabetes mellitus without complication ([facility] [address]      Family History   Problem Relation Age of Onset    Diabetes Mother     Kidney Disease Mother     Heart Disease Father 76        mi, PACEMAKER    Heart Attack Father     High Blood Pressure Sister     Lung Cancer Sister         LUNG, STOMACH, BRAIN    High Blood Pressure Brother     Cancer Brother         colon cancer    Cancer Sister         leukemia    Lung Cancer Son         w/ brain mets  age 55    Liver Cancer Maternal Grandmother     Pneumonia Maternal Grandfather     Heart Attack Paternal Grandmother     COPD Paternal Grandfather     Substance Abuse Son      Past Surgical History:   Procedure Laterality Date    APPENDECTOMY      BUNIONECTOMY      rt    CARDIAC CATHETERIZATION       states was normal    CARPAL TUNNEL RELEASE      rt hand    HAMMER TOE SURGERY      rt    KNEE JOINT MANIPULATION Right 6/18/15    TOTAL KNEE ARTHROPLASTY  May, 2015    Right    TUBAL LIGATION       Social History     Tobacco Use    Smoking status: Former Smoker     Packs/day: 1.00     Years: 30.00     Pack years: 30.00     Types: Cigarettes     Quit date: 2007     Years since quittin.3    Smokeless tobacco: Former User     Quit date: 2006   Vaping Use    Vaping Use: Never used   Substance Use Topics    Alcohol use: Yes     Alcohol/week: 2.5 standard drinks     Types: 3 Standard drinks or equivalent per week     Comment: occ    Drug use: No           Diagnostic studies:    No results found.       Procedures:    Debridement of nails 1, 2, 5 right foot and nails 1, 2, 5 left foot was performed with both manual and electrical debridement to prevent infection and/or ulceration. Patient tolerated the debridement well, without any complaints. Patient had no issues with debridement performed on today's visit. Exam:  VASCULAR: Pedal pulses diminished palpation bilateral foot. Capillary refill time less than 5 seconds digits 1 through 5 bilateral foot. Diminished hair growth noted to both lower extremities. NEUROLOGICAL: Epicritic sensations intact and symmetrical  DERMATOLOGICAL: Digital nails 1, 2, 5 bilaterally are thickened, dystrophic, discolored, with subungual debris present and tenderness noted to palpation. No maceration of webspaces noted bilateral foot. No plantar calluses, ulcerations, heel fissuring noted bilateral foot. Mild edematous issues noted to both lower extremities with both varicosities and stasis skin changes present bilaterally. MUSCULOSKELETAL: Mild contraction deformities noted digital regions bilateral foot. Plan Per Assessment  Tamara Morse was seen today for nail problem. Diagnoses and all orders for this visit:    Onychomycosis    Pain of toe of right foot    Pain of toe of left foot    Type II diabetes mellitus with peripheral circulatory disorder (HCC)    Venous insufficiency (chronic) (peripheral)        1. New patient evaluation and management  2. Mycotic nail debridement performed on today's visit as described above to patient tolerance. 3. We did discuss diabetic foot care options with patient in detail today. Did discuss shoe gear recommendations as well to prevent any potential lower extremity issues from occurring. 4. Patient will be followed up at a later date for continued evaluation and care. Seen By:    Devorah Montero DPM    Electronically signed by Devorah Montero DPM on 2/10/2022 at 9:35 AM      This note was created using voice recognition software.   The note was reviewed however may contain grammatical errors.

## 2022-02-10 NOTE — PROGRESS NOTES
Patient is in today for evaluation of bilateral great toenail fungus.  pcp is Jalene Schilder, LOLY - CNP  Last ov 2/1/22

## 2022-02-11 ENCOUNTER — TELEPHONE (OUTPATIENT)
Dept: FAMILY MEDICINE CLINIC | Age: 72
End: 2022-02-11

## 2022-03-03 DIAGNOSIS — E11.9 TYPE 2 DIABETES MELLITUS WITHOUT COMPLICATION, WITHOUT LONG-TERM CURRENT USE OF INSULIN (HCC): ICD-10-CM

## 2022-03-03 RX ORDER — METFORMIN HYDROCHLORIDE 500 MG/1
TABLET, EXTENDED RELEASE ORAL
Qty: 90 TABLET | Refills: 0 | OUTPATIENT
Start: 2022-03-03

## 2022-03-21 ENCOUNTER — HOSPITAL ENCOUNTER (OUTPATIENT)
Age: 72
Discharge: HOME | End: 2022-03-21
Payer: MEDICARE

## 2022-03-21 DIAGNOSIS — E11.9 TYPE 2 DIABETES MELLITUS WITHOUT COMPLICATION, WITHOUT LONG-TERM CURRENT USE OF INSULIN (HCC): ICD-10-CM

## 2022-03-21 DIAGNOSIS — I10 ESSENTIAL HYPERTENSION: ICD-10-CM

## 2022-03-21 DIAGNOSIS — E78.2 MIXED HYPERLIPIDEMIA: ICD-10-CM

## 2022-03-21 LAB
ALBUMIN SERPL-MCNC: 4.1 G/DL (ref 3.5–5.2)
ALP BLD-CCNC: 87 U/L (ref 35–104)
ALT SERPL-CCNC: 16 U/L (ref 0–32)
ANION GAP SERPL CALCULATED.3IONS-SCNC: 13 MMOL/L (ref 7–16)
AST SERPL-CCNC: 18 U/L (ref 0–31)
BASOPHILS ABSOLUTE: 0.04 E9/L (ref 0–0.2)
BASOPHILS RELATIVE PERCENT: 0.5 % (ref 0–2)
BILIRUB SERPL-MCNC: 0.3 MG/DL (ref 0–1.2)
BUN BLDV-MCNC: 12 MG/DL (ref 6–23)
CALCIUM SERPL-MCNC: 9.7 MG/DL (ref 8.6–10.2)
CHLORIDE BLD-SCNC: 102 MMOL/L (ref 98–107)
CHOLESTEROL, TOTAL: 176 MG/DL (ref 0–199)
CO2: 28 MMOL/L (ref 22–29)
CREAT SERPL-MCNC: 0.7 MG/DL (ref 0.5–1)
EOSINOPHILS ABSOLUTE: 0.37 E9/L (ref 0.05–0.5)
EOSINOPHILS RELATIVE PERCENT: 5.1 % (ref 0–6)
GFR AFRICAN AMERICAN: >60
GFR NON-AFRICAN AMERICAN: >60 ML/MIN/1.73
GLUCOSE BLD-MCNC: 117 MG/DL (ref 74–99)
HCT VFR BLD CALC: 39.2 % (ref 34–48)
HDLC SERPL-MCNC: 58 MG/DL
HEMOGLOBIN: 12.5 G/DL (ref 11.5–15.5)
IMMATURE GRANULOCYTES #: 0.02 E9/L
IMMATURE GRANULOCYTES %: 0.3 % (ref 0–5)
LDL CHOLESTEROL CALCULATED: 86 MG/DL (ref 0–99)
LYMPHOCYTES ABSOLUTE: 2.09 E9/L (ref 1.5–4)
LYMPHOCYTES RELATIVE PERCENT: 28.7 % (ref 20–42)
MCH RBC QN AUTO: 27.4 PG (ref 26–35)
MCHC RBC AUTO-ENTMCNC: 31.9 % (ref 32–34.5)
MCV RBC AUTO: 86 FL (ref 80–99.9)
MONOCYTES ABSOLUTE: 0.8 E9/L (ref 0.1–0.95)
MONOCYTES RELATIVE PERCENT: 11 % (ref 2–12)
NEUTROPHILS ABSOLUTE: 3.97 E9/L (ref 1.8–7.3)
NEUTROPHILS RELATIVE PERCENT: 54.4 % (ref 43–80)
PDW BLD-RTO: 14.3 FL (ref 11.5–15)
PLATELET # BLD: 177 E9/L (ref 130–450)
PMV BLD AUTO: 10.8 FL (ref 7–12)
POTASSIUM SERPL-SCNC: 4.7 MMOL/L (ref 3.5–5)
RBC # BLD: 4.56 E12/L (ref 3.5–5.5)
SODIUM BLD-SCNC: 143 MMOL/L (ref 132–146)
TOTAL PROTEIN: 7.8 G/DL (ref 6.4–8.3)
TRIGL SERPL-MCNC: 158 MG/DL (ref 0–149)
VLDLC SERPL CALC-MCNC: 32 MG/DL
WBC # BLD: 7.3 E9/L (ref 4.5–11.5)

## 2022-03-21 PROCEDURE — 85025 COMPLETE CBC W/AUTO DIFF WBC: CPT

## 2022-03-21 PROCEDURE — 80061 LIPID PANEL: CPT

## 2022-03-21 PROCEDURE — 36415 COLL VENOUS BLD VENIPUNCTURE: CPT

## 2022-03-21 PROCEDURE — 80053 COMPREHEN METABOLIC PANEL: CPT

## 2022-03-31 ENCOUNTER — OFFICE VISIT (OUTPATIENT)
Dept: FAMILY MEDICINE CLINIC | Age: 72
End: 2022-03-31
Payer: MEDICARE

## 2022-03-31 VITALS
SYSTOLIC BLOOD PRESSURE: 122 MMHG | TEMPERATURE: 97.6 F | HEART RATE: 104 BPM | WEIGHT: 165 LBS | HEIGHT: 67 IN | OXYGEN SATURATION: 93 % | DIASTOLIC BLOOD PRESSURE: 82 MMHG | RESPIRATION RATE: 16 BRPM | BODY MASS INDEX: 25.9 KG/M2

## 2022-03-31 DIAGNOSIS — F32.5 MAJOR DEPRESSIVE DISORDER WITH SINGLE EPISODE, IN FULL REMISSION (HCC): ICD-10-CM

## 2022-03-31 DIAGNOSIS — E11.9 TYPE 2 DIABETES MELLITUS WITHOUT COMPLICATION, WITHOUT LONG-TERM CURRENT USE OF INSULIN (HCC): ICD-10-CM

## 2022-03-31 DIAGNOSIS — I10 ESSENTIAL HYPERTENSION: ICD-10-CM

## 2022-03-31 DIAGNOSIS — Z78.0 ASYMPTOMATIC MENOPAUSAL STATE: ICD-10-CM

## 2022-03-31 DIAGNOSIS — K21.9 GASTROESOPHAGEAL REFLUX DISEASE WITHOUT ESOPHAGITIS: ICD-10-CM

## 2022-03-31 DIAGNOSIS — Z12.31 SCREENING MAMMOGRAM FOR BREAST CANCER: ICD-10-CM

## 2022-03-31 DIAGNOSIS — Z00.00 INITIAL MEDICARE ANNUAL WELLNESS VISIT: Primary | ICD-10-CM

## 2022-03-31 DIAGNOSIS — E78.2 MIXED HYPERLIPIDEMIA: ICD-10-CM

## 2022-03-31 PROCEDURE — 3044F HG A1C LEVEL LT 7.0%: CPT | Performed by: NURSE PRACTITIONER

## 2022-03-31 PROCEDURE — 4040F PNEUMOC VAC/ADMIN/RCVD: CPT | Performed by: NURSE PRACTITIONER

## 2022-03-31 PROCEDURE — G8484 FLU IMMUNIZE NO ADMIN: HCPCS | Performed by: NURSE PRACTITIONER

## 2022-03-31 PROCEDURE — 3017F COLORECTAL CA SCREEN DOC REV: CPT | Performed by: NURSE PRACTITIONER

## 2022-03-31 PROCEDURE — 1123F ACP DISCUSS/DSCN MKR DOCD: CPT | Performed by: NURSE PRACTITIONER

## 2022-03-31 PROCEDURE — G0438 PPPS, INITIAL VISIT: HCPCS | Performed by: NURSE PRACTITIONER

## 2022-03-31 RX ORDER — GLIPIZIDE 5 MG/1
5 TABLET, FILM COATED, EXTENDED RELEASE ORAL DAILY
Qty: 90 TABLET | Refills: 3 | Status: SHIPPED | OUTPATIENT
Start: 2022-03-31

## 2022-03-31 RX ORDER — AMLODIPINE BESYLATE 5 MG/1
5 TABLET ORAL DAILY
Qty: 90 TABLET | Refills: 3 | Status: SHIPPED | OUTPATIENT
Start: 2022-03-31

## 2022-03-31 RX ORDER — CITALOPRAM 20 MG/1
20 TABLET ORAL DAILY
Qty: 90 TABLET | Refills: 3 | Status: SHIPPED | OUTPATIENT
Start: 2022-03-31

## 2022-03-31 RX ORDER — PRAVASTATIN SODIUM 40 MG
40 TABLET ORAL DAILY
Qty: 90 TABLET | Refills: 3 | Status: SHIPPED | OUTPATIENT
Start: 2022-03-31

## 2022-03-31 RX ORDER — OMEPRAZOLE 40 MG/1
40 CAPSULE, DELAYED RELEASE ORAL DAILY
Qty: 90 CAPSULE | Refills: 3 | Status: SHIPPED | OUTPATIENT
Start: 2022-03-31

## 2022-03-31 RX ORDER — METFORMIN HYDROCHLORIDE 500 MG/1
500 TABLET, EXTENDED RELEASE ORAL
Qty: 270 TABLET | Refills: 3 | Status: SHIPPED | OUTPATIENT
Start: 2022-03-31 | End: 2023-03-26

## 2022-03-31 ASSESSMENT — PATIENT HEALTH QUESTIONNAIRE - PHQ9
SUM OF ALL RESPONSES TO PHQ9 QUESTIONS 1 & 2: 0
2. FEELING DOWN, DEPRESSED OR HOPELESS: 0
SUM OF ALL RESPONSES TO PHQ QUESTIONS 1-9: 0
1. LITTLE INTEREST OR PLEASURE IN DOING THINGS: 0

## 2022-03-31 ASSESSMENT — LIFESTYLE VARIABLES
HOW OFTEN DO YOU HAVE A DRINK CONTAINING ALCOHOL: MONTHLY OR LESS
HOW MANY STANDARD DRINKS CONTAINING ALCOHOL DO YOU HAVE ON A TYPICAL DAY: 1 OR 2

## 2022-03-31 NOTE — PROGRESS NOTES
Medicare Annual Wellness Visit    Joaquin Dias is here for Medicare AWV and Health Maintenance (due for diabetic eye, tdap, pneu23, dexa scan, flu)    Assessment & Plan   Initial Medicare annual wellness visit  Screening mammogram for breast cancer  -     RUDDY DIGITAL SCREEN W OR WO CAD BILATERAL; Future  Asymptomatic menopausal state  -     DEXA Bone Density Axial Skeleton; Future    Not addressed only refills today  Type 2 diabetes mellitus without complication, without long-term current use of insulin (HCC)  -     metFORMIN (GLUCOPHAGE-XR) 500 MG extended release tablet; Take 1 tablet by mouth 3 times daily (with meals), Disp-270 tablet, R-3Normal  -     glipiZIDE (GLIPIZIDE XL) 5 MG extended release tablet; Take 1 tablet by mouth daily, Disp-90 tablet, R-3Normal  Essential hypertension  -     amLODIPine (NORVASC) 5 MG tablet; Take 1 tablet by mouth daily, Disp-90 tablet, R-3Normal  Mixed hyperlipidemia  -     pravastatin (PRAVACHOL) 40 MG tablet; Take 1 tablet by mouth daily, Disp-90 tablet, R-3Normal  Gastroesophageal reflux disease without esophagitis  -     omeprazole (PRILOSEC) 40 MG delayed release capsule; Take 1 capsule by mouth daily, Disp-90 capsule, R-3Normal  Major depressive disorder with single episode, in full remission ([facility]  -     citalopram (CELEXA) 20 MG tablet; Take 1 tablet by mouth daily, Disp-90 tablet, R-3Normal      Recommendations for Preventive Services Due: see orders and patient instructions/AVS.  Recommended screening schedule for the next 5-10 years is provided to the patient in written form: see Patient Instructions/AVS.     Return in 3 months (on 6/30/2022) for DM,. HTN and then Medicare Annual Wellness Visit in 1 year. Subjective   Needs refills today. Patient's complete Health Risk Assessment and screening values have been reviewed and are found in Flowsheets.  The following problems were reviewed today and where indicated follow up appointments were made and/or referrals ordered. Positive Risk Factor Screenings with Interventions:              Health Habits/Nutrition:     Physical Activity: Sufficiently Active    Days of Exercise per Week: 4 days    Minutes of Exercise per Session: 40 min     Have you lost any weight without trying in the past 3 months?: No  Body mass index: (!) 25.84  Have you seen the dentist within the past year?: (!) No    Health Habits/Nutrition Interventions:  · Dental exam overdue:  patient encouraged to make appointment with his/her dentist     Safety:  Do you have working smoke detectors?: Yes  Do you have any tripping hazards - loose or unsecured carpets or rugs?: No  Do you have any tripping hazards - clutter in doorways, halls, or stairs?: No  Do you have either shower bars, grab bars, non-slip mats or non-slip surfaces in your shower or bathtub?: Yes  Do all of your stairways have a railing or banister?: (!) No  Do you always fasten your seatbelt when you are in a car?: Yes    Safety Interventions:  · Home safety tips provided           Objective   Vitals:    03/31/22 0824   BP: 122/82   Pulse: 104   Resp: 16   Temp: 97.6 °F (36.4 °C)   SpO2: 93%   Weight: 165 lb (74.8 kg)   Height: 5' 7\" (1.702 m)      Body mass index is 25.84 kg/m².         General Appearance: alert and oriented to person, place and time, well developed and well- nourished, in no acute distress  Skin: warm and dry, no rash or erythema  Head: normocephalic and atraumatic  Eyes: pupils equal, round, and reactive to light, extraocular eye movements intact, conjunctivae normal  ENT: tympanic membrane, external ear and ear canal normal bilaterally, nose without deformity, nasal mucosa and turbinates normal without polyps  Neck: supple and non-tender without mass, no thyromegaly or thyroid nodules, no cervical lymphadenopathy  Pulmonary/Chest: clear to auscultation bilaterally- no wheezes, rales or rhonchi, normal air movement, no respiratory distress  Cardiovascular: normal rate, regular rhythm, normal S1 and S2, no murmurs, rubs, clicks, or gallops, distal pulses intact, no carotid bruits  Abdomen: soft, non-tender, non-distended, normal bowel sounds, no masses or organomegaly  Breast: appear normal, no suspicious masses, no skin or nipple changes or axillary nodes  Extremities: no cyanosis, clubbing or edema  Musculoskeletal: normal range of motion, no joint swelling, deformity or tenderness  Neurologic: reflexes normal and symmetric, no cranial nerve deficit, gait, coordination and speech normal       Allergies   Allergen Reactions    Ace Inhibitors      Cough        Prior to Visit Medications    Medication Sig Taking?  Authorizing Provider   metFORMIN (GLUCOPHAGE-XR) 500 MG extended release tablet Take 1 tablet by mouth 3 times daily (with meals) Yes LOLY Carlos CNP   omeprazole (PRILOSEC) 40 MG delayed release capsule Take 1 capsule by mouth daily Yes LOLY Garcia CNP   pravastatin (PRAVACHOL) 40 MG tablet Take 1 tablet by mouth daily Yes LOLY Garcia CNP   glipiZIDE (GLIPIZIDE XL) 5 MG extended release tablet Take 1 tablet by mouth daily Yes LOLY Garcia CNP   citalopram (CELEXA) 20 MG tablet Take 1 tablet by mouth daily Yes LOLY Garcia CNP   amLODIPine (NORVASC) 5 MG tablet Take 1 tablet by mouth daily Yes LOLY Garcia CNP   EPINEPHrine (EPIPEN) 0.3 MG/0.3ML SOAJ injection inject as directed if needed FOR ANAPHYLAXIS ONCE Yes Historical Provider, MD   aspirin 81 MG tablet Take 81 mg by mouth every evening Yes Historical Provider, MD   triamterene-hydroCHLOROthiazide (MAXZIDE-25) 37.5-25 MG per tablet Take 0.5 tablets by mouth daily  Patient not taking: Reported on 3/31/2022  [facility] (Including outside providers/suppliers regularly involved in providing care):   Patient Care Team:  LOLY Garcia CNP as PCP - General (Family Nurse Practitioner)  LOLY Garcia CNP as PCP - [facility] Provider    Reviewed and updated this visit:  Tobacco  Allergies  Meds  Problems  Med Hx  Surg Hx  Soc Hx  Fam Hx                  Advance Care Planning   Advanced Care Planning: Discussed the patients choices for care and treatment in case of a health event that adversely affects decision-making abilities. Also discussed the patients long-term treatment options. Reviewed with the patient the appropriate state-specific advance directive documents. Reviewed the process of designating a competent adult as an Agent (or -in-fact) that could take make health care decisions for the patient if incompetent. Patient was asked to complete the declaration forms, either acknowledge the forms by a public notary or an eligible witness and provide a signed copy to the practice office. Time spent (minutes): 3 minutes Instructed to bring copy to the office    Cardiovascular Disease Risk Counseling: Assessed the patient's risk to develop cardiovascular disease and reviewed main risk factors. Reviewed steps to reduce disease risk including:   · Quitting tobacco use, reducing amount smoked, or not starting the habit  · Making healthy food choices  · Being physically active and gradualy increasing activity levels   · Reduce weight and determine a healthy BMI goal  · Monitor blood pressure and treat if higher than 140/90 mmHg  · Maintain blood total cholesterol levels under 5 mmol/l or 190 mg/dl  · Maintain LDL cholesterol levels under 3.0 mmol/l or 115 mg/dl   · Control blood glucose levels  · Consider taking aspirin (75 mg daily), once blood pressure is controlled   Provided a follow up plan.   Time spent (minutes): 10 minutes

## 2022-03-31 NOTE — PATIENT INSTRUCTIONS
Personalized Preventive Plan for Leena Chu - 3/31/2022  Medicare offers a range of preventive health benefits. Some of the tests and screenings are paid in full while other may be subject to a deductible, co-insurance, and/or copay. Some of these benefits include a comprehensive review of your medical history including lifestyle, illnesses that may run in your family, and various assessments and screenings as appropriate. After reviewing your medical record and screening and assessments performed today your provider may have ordered immunizations, labs, imaging, and/or referrals for you. A list of these orders (if applicable) as well as your Preventive Care list are included within your After Visit Summary for your review. Other Preventive Recommendations:    · A preventive eye exam performed by an eye specialist is recommended every 1-2 years to screen for glaucoma; cataracts, macular degeneration, and other eye disorders. · A preventive dental visit is recommended every 6 months. · Try to get at least 150 minutes of exercise per week or 10,000 steps per day on a pedometer . · Order or download the FREE \"Exercise & Physical Activity: Your Everyday Guide\" from [employer]. Call 9-321.966.8236 or search [employer] online. · You need 4352-3249 mg of calcium and 3258-6002 IU of vitamin D per day. It is possible to meet your calcium requirement with diet alone, but a vitamin D supplement is usually necessary to meet this goal.  · When exposed to the sun, use a sunscreen that protects against both UVA and UVB radiation with an SPF of 30 or greater. Reapply every 2 to 3 hours or after sweating, drying off with a towel, or swimming. · Always wear a seat belt when traveling in a car. Always wear a helmet when riding a bicycle or motorcycle.

## 2022-04-14 ENCOUNTER — OFFICE VISIT (OUTPATIENT)
Dept: PODIATRY | Age: 72
End: 2022-04-14
Payer: MEDICARE

## 2022-04-14 VITALS — BODY MASS INDEX: 25.11 KG/M2 | HEIGHT: 67 IN | WEIGHT: 160 LBS

## 2022-04-14 DIAGNOSIS — E11.51 TYPE II DIABETES MELLITUS WITH PERIPHERAL CIRCULATORY DISORDER (HCC): ICD-10-CM

## 2022-04-14 DIAGNOSIS — M79.674 PAIN OF TOE OF RIGHT FOOT: ICD-10-CM

## 2022-04-14 DIAGNOSIS — B35.1 ONYCHOMYCOSIS: Primary | ICD-10-CM

## 2022-04-14 DIAGNOSIS — M79.675 PAIN OF TOE OF LEFT FOOT: ICD-10-CM

## 2022-04-14 DIAGNOSIS — I87.2 VENOUS INSUFFICIENCY (CHRONIC) (PERIPHERAL): ICD-10-CM

## 2022-04-14 PROCEDURE — 11721 DEBRIDE NAIL 6 OR MORE: CPT | Performed by: PODIATRIST

## 2022-04-14 PROCEDURE — 99999 PR OFFICE/OUTPT VISIT,PROCEDURE ONLY: CPT | Performed by: PODIATRIST

## 2022-04-14 NOTE — PROGRESS NOTES
22     [facility]    : 1950  Sex: female  Age: 80 y.o. Subjective: The patient is seen today for evaluation regarding headache foot evaluation and mycotic nail care. No other complaints noted. Chief Complaint   Patient presents with    Diabetes     foot care       Current Medications:    Current Outpatient Medications:     metFORMIN (GLUCOPHAGE-XR) 500 MG extended release tablet, Take 1 tablet by mouth 3 times daily (with meals), Disp: 270 tablet, Rfl: 3    omeprazole (PRILOSEC) 40 MG delayed release capsule, Take 1 capsule by mouth daily, Disp: 90 capsule, Rfl: 3    pravastatin (PRAVACHOL) 40 MG tablet, Take 1 tablet by mouth daily, Disp: 90 tablet, Rfl: 3    glipiZIDE (GLIPIZIDE XL) 5 MG extended release tablet, Take 1 tablet by mouth daily, Disp: 90 tablet, Rfl: 3    citalopram (CELEXA) 20 MG tablet, Take 1 tablet by mouth daily, Disp: 90 tablet, Rfl: 3    amLODIPine (NORVASC) 5 MG tablet, Take 1 tablet by mouth daily, Disp: 90 tablet, Rfl: 3    EPINEPHrine (EPIPEN) 0.3 MG/0.3ML SOAJ injection, inject as directed if needed FOR ANAPHYLAXIS ONCE, Disp: , Rfl:     aspirin 81 MG tablet, Take 81 mg by mouth every evening, Disp: , Rfl:     triamterene-hydroCHLOROthiazide (MAXZIDE-25) 37.5-25 MG per tablet, Take 0.5 tablets by mouth daily (Patient not taking: Reported on 3/31/2022), Disp: 45 tablet, Rfl: 1    Allergies:   Allergies   Allergen Reactions    Ace Inhibitors      Cough          Past Surgical History:   Procedure Laterality Date    APPENDECTOMY      BUNIONECTOMY      rt    CARDIAC CATHETERIZATION       states was normal    CARPAL TUNNEL RELEASE      rt hand    HAMMER TOE SURGERY      rt    KNEE JOINT MANIPULATION Right 6/18/15    TOTAL KNEE ARTHROPLASTY  May, 2015    Right    TUBAL LIGATION       Past Medical History:   Diagnosis Date    Allergic rhinitis     Asthma     Chronic back pain     HURT BACK IN     COPD (chronic obstructive pulmonary disease) ([facility]     Depression     Diabetes mellitus ([facility]     GERD (gastroesophageal reflux disease)     Hyperlipidemia     Hypertension     Osteoarthritis     Type 2 diabetes mellitus without complication ([facility])        Vitals:    04/14/22 0831   Weight: 160 lb (72.6 kg)   Height: 5' 7\" (1.702 m)       Exam:  Pedal pulses diminished to palpation bilateral foot. Capillary refill time delayed digital regions bilateral foot. At this time the nail/s 1, 2, 5 right foot and nail/s 1, 2, 5 left foot are noted to be thickened, dystrophic and discolored with subungual debris present. Tenderness noted to palpation. Minimal hair growth is noted to both lower extremities. Edema noted with both varicosities and stasis skin changes present bilaterally. Coolness is noted to the digital regions to palpation. Capillary fill time delayed digital areas bilateral foot. No heel fissuring or macerations of the web spaces. No plantar calluses and/or ulcerative areas are noted. Patient is having difficulty with gait/walking. Plan Per Assessment  Lyubov Lemon was seen today for diabetes. Diagnoses and all orders for this visit:    Onychomycosis    Pain of toe of right foot    Pain of toe of left foot    Type II diabetes mellitus with peripheral circulatory disorder (HCC)    Venous insufficiency (chronic) (peripheral)        1. Evaluation and Management  2. Manual and electrical debridement of the mycotic nails was performed for thickness and length to prevent injection and/or ulceration. 3. I recommended antifungal cream to the nails daily. 4. Diabetic foot care techniques were discussed with patient in detail today. 5. We will see the patient back at a later date for continued podiatric management and care. Patient was advised to call the office with any questions or concerns prior to their next appointment if needed.         Seen By:    Lima Banegas DPM    Electronically signed by Lima Banegas DPM on 4/14/2022 at 8:43 AM      This note was created using voice recognition software. The note was reviewed however may contain grammatical errors.

## 2022-04-14 NOTE — PROGRESS NOTES
Patient in today for nail care. Patient does not have any complaints of pain at this time.  Patient's PCP is Jeannette Dandy, APRN - CNP date of last ov 3/31/22          Flavia Garcia LPN

## 2022-06-16 LAB — DIABETIC RETINOPATHY: NEGATIVE

## 2022-06-21 ENCOUNTER — PROCEDURE VISIT (OUTPATIENT)
Dept: PODIATRY | Age: 72
End: 2022-06-21
Payer: MEDICARE

## 2022-06-21 VITALS — HEIGHT: 67 IN | WEIGHT: 162 LBS | BODY MASS INDEX: 25.43 KG/M2

## 2022-06-21 DIAGNOSIS — R26.2 DIFFICULTY WALKING: ICD-10-CM

## 2022-06-21 DIAGNOSIS — M79.675 PAIN OF TOE OF LEFT FOOT: ICD-10-CM

## 2022-06-21 DIAGNOSIS — M79.674 PAIN OF TOE OF RIGHT FOOT: ICD-10-CM

## 2022-06-21 DIAGNOSIS — E11.9 TYPE 2 DIABETES MELLITUS WITHOUT COMPLICATION, WITHOUT LONG-TERM CURRENT USE OF INSULIN (HCC): ICD-10-CM

## 2022-06-21 DIAGNOSIS — I87.2 VENOUS INSUFFICIENCY (CHRONIC) (PERIPHERAL): ICD-10-CM

## 2022-06-21 DIAGNOSIS — B35.1 ONYCHOMYCOSIS: Primary | ICD-10-CM

## 2022-06-21 PROCEDURE — 11721 DEBRIDE NAIL 6 OR MORE: CPT | Performed by: PODIATRIST

## 2022-06-21 NOTE — PROGRESS NOTES
22     Lianne Every    : 1950  Sex: female  Age: 80 y.o. Subjective: The patient is seen today for evaluation regarding diabetic foot evaluation and mycotic nail care. No other complaints noted. Chief Complaint   Patient presents with    Diabetes     foot care       Current Medications:    Current Outpatient Medications:     metFORMIN (GLUCOPHAGE-XR) 500 MG extended release tablet, Take 1 tablet by mouth 3 times daily (with meals), Disp: 270 tablet, Rfl: 3    omeprazole (PRILOSEC) 40 MG delayed release capsule, Take 1 capsule by mouth daily, Disp: 90 capsule, Rfl: 3    pravastatin (PRAVACHOL) 40 MG tablet, Take 1 tablet by mouth daily, Disp: 90 tablet, Rfl: 3    glipiZIDE (GLIPIZIDE XL) 5 MG extended release tablet, Take 1 tablet by mouth daily, Disp: 90 tablet, Rfl: 3    citalopram (CELEXA) 20 MG tablet, Take 1 tablet by mouth daily, Disp: 90 tablet, Rfl: 3    amLODIPine (NORVASC) 5 MG tablet, Take 1 tablet by mouth daily, Disp: 90 tablet, Rfl: 3    EPINEPHrine (EPIPEN) 0.3 MG/0.3ML SOAJ injection, inject as directed if needed FOR ANAPHYLAXIS ONCE, Disp: , Rfl:     aspirin 81 MG tablet, Take 81 mg by mouth every evening, Disp: , Rfl:     triamterene-hydroCHLOROthiazide (MAXZIDE-25) 37.5-25 MG per tablet, Take 0.5 tablets by mouth daily (Patient not taking: Reported on 3/31/2022), Disp: 45 tablet, Rfl: 1    Allergies:   Allergies   Allergen Reactions    Ace Inhibitors      Cough          Past Surgical History:   Procedure Laterality Date    APPENDECTOMY      BUNIONECTOMY      rt    CARDIAC CATHETERIZATION       states was normal    CARPAL TUNNEL RELEASE      rt hand    HAMMER TOE SURGERY      rt    KNEE JOINT MANIPULATION Right 6/18/15    TOTAL KNEE ARTHROPLASTY  May, 2015    Right    TUBAL LIGATION       Past Medical History:   Diagnosis Date    Allergic rhinitis     Asthma     Chronic back pain     HURT BACK IN     [facility]chronic obstructive [facility] disease) ([facility]     Depression     [facility]     GERD (gastroesophageal reflux disease)     Hyperlipidemia     Hypertension     Osteoarthritis     Type 2 diabetes mellitus without complication ([facility]        Vitals:    06/21/22 0838   Weight: 162 lb (73.5 kg)   Height: 5' 7\" (1.702 m)       Exam:  Pedal pulses diminished to palpation bilateral foot. Capillary refill time delayed digital regions bilateral foot. At this time the nail/s 1, 2, 5 right foot and nail/s 1, 2, 5 left foot are noted to be thickened, dystrophic and discolored with subungual debris present. Tenderness noted to palpation. Minimal hair growth is noted to both lower extremities. Edema noted with both varicosities and stasis skin changes present bilaterally. Coolness is noted to the digital regions to palpation. Capillary fill time delayed digital areas bilateral foot. No heel fissuring or macerations of the web spaces. No plantar calluses and/or ulcerative areas are noted. Patient is having difficulty with gait/walking. Plan Per Assessment  Mahsa Pratt was seen today for diabetes. Diagnoses and all orders for this visit:    Onychomycosis    Pain of toe of right foot    Pain of toe of left foot    Type 2 diabetes mellitus without complication, without long-term current use of insulin (HCC)    Venous insufficiency (chronic) (peripheral)    Difficulty walking        1. Evaluation and Management  2. Manual and electrical debridement of the mycotic nails was performed for thickness and length to prevent injection and/or ulceration. 3. Discussed additional diabetic lower extremity care techniques with patient today. 4. It was discussed in detail with the patient proper caring for the vascular compromised foot. The fact that they have compromised blood flow put the patient at risk for infection/gangrene/amputation. The patient should not walk barefoot. Shoe gear should fit properly and socks should be worn with shoes. If any skin lesions are noted, they are instructed to contact the office immediately. 5. We will see the patient back at a later date for continued podiatric management and care. Patient was advised to call the office with any questions or concerns prior to their next appointment if needed. Seen By:    Tasia Carpio DPM    Electronically signed by Tasia Carpio DPM on 6/21/2022 at 9:02 AM      This note was created using voice recognition software. The note was reviewed however may contain grammatical errors.

## 2022-06-21 NOTE — PROGRESS NOTES
Patient is in today for 2 month diabetic foot care. Patient is doing well.  pcp is Kolby Ag, LOLY - CNP  Last ov 3/31/22

## 2022-06-30 ENCOUNTER — OFFICE VISIT (OUTPATIENT)
Dept: FAMILY MEDICINE CLINIC | Age: 72
End: 2022-06-30
Payer: MEDICARE

## 2022-06-30 VITALS
HEART RATE: 80 BPM | WEIGHT: 163 LBS | RESPIRATION RATE: 16 BRPM | HEIGHT: 67 IN | BODY MASS INDEX: 25.58 KG/M2 | DIASTOLIC BLOOD PRESSURE: 78 MMHG | SYSTOLIC BLOOD PRESSURE: 124 MMHG | OXYGEN SATURATION: 98 % | TEMPERATURE: 97.6 F

## 2022-06-30 DIAGNOSIS — J43.9 MILD EMPHYSEMA (HCC): ICD-10-CM

## 2022-06-30 DIAGNOSIS — E78.2 MIXED HYPERLIPIDEMIA: ICD-10-CM

## 2022-06-30 DIAGNOSIS — I10 ESSENTIAL HYPERTENSION: ICD-10-CM

## 2022-06-30 DIAGNOSIS — E11.9 TYPE 2 DIABETES MELLITUS WITHOUT COMPLICATION, WITHOUT LONG-TERM CURRENT USE OF INSULIN (HCC): Primary | ICD-10-CM

## 2022-06-30 PROBLEM — T78.3XXA ANGIO-EDEMA: Status: RESOLVED | Noted: 2021-06-21 | Resolved: 2022-06-30

## 2022-06-30 LAB
CHP ED QC CHECK: ABNORMAL
GLUCOSE BLD-MCNC: 132 MG/DL
HBA1C MFR BLD: 6.4 %

## 2022-06-30 PROCEDURE — 2022F DILAT RTA XM EVC RTNOPTHY: CPT | Performed by: NURSE PRACTITIONER

## 2022-06-30 PROCEDURE — G8400 PT W/DXA NO RESULTS DOC: HCPCS | Performed by: NURSE PRACTITIONER

## 2022-06-30 PROCEDURE — 3023F SPIROM DOC REV: CPT | Performed by: NURSE PRACTITIONER

## 2022-06-30 PROCEDURE — G8427 DOCREV CUR MEDS BY ELIG CLIN: HCPCS | Performed by: NURSE PRACTITIONER

## 2022-06-30 PROCEDURE — 83036 HEMOGLOBIN GLYCOSYLATED A1C: CPT | Performed by: NURSE PRACTITIONER

## 2022-06-30 PROCEDURE — 82962 GLUCOSE BLOOD TEST: CPT | Performed by: NURSE PRACTITIONER

## 2022-06-30 PROCEDURE — G8417 CALC BMI ABV UP PARAM F/U: HCPCS | Performed by: NURSE PRACTITIONER

## 2022-06-30 PROCEDURE — 3044F HG A1C LEVEL LT 7.0%: CPT | Performed by: NURSE PRACTITIONER

## 2022-06-30 PROCEDURE — 3017F COLORECTAL CA SCREEN DOC REV: CPT | Performed by: NURSE PRACTITIONER

## 2022-06-30 PROCEDURE — 1123F ACP DISCUSS/DSCN MKR DOCD: CPT | Performed by: NURSE PRACTITIONER

## 2022-06-30 PROCEDURE — 1090F PRES/ABSN URINE INCON ASSESS: CPT | Performed by: NURSE PRACTITIONER

## 2022-06-30 PROCEDURE — 99214 OFFICE O/P EST MOD 30 MIN: CPT | Performed by: NURSE PRACTITIONER

## 2022-06-30 PROCEDURE — 1036F TOBACCO NON-USER: CPT | Performed by: NURSE PRACTITIONER

## 2022-06-30 ASSESSMENT — ENCOUNTER SYMPTOMS
SINUS PAIN: 0
WHEEZING: 0
COLOR CHANGE: 0
CONSTIPATION: 0
DIARRHEA: 0
VOMITING: 0
FACIAL SWELLING: 0
SHORTNESS OF BREATH: 0
COUGH: 0
TROUBLE SWALLOWING: 0
BACK PAIN: 0
RHINORRHEA: 0
ABDOMINAL PAIN: 0
VOICE CHANGE: 0
CHEST TIGHTNESS: 0
SINUS PRESSURE: 0
NAUSEA: 0
SORE THROAT: 0

## 2022-06-30 ASSESSMENT — PATIENT HEALTH QUESTIONNAIRE - PHQ9
SUM OF ALL RESPONSES TO PHQ QUESTIONS 1-9: 0
5. POOR APPETITE OR OVEREATING: 0
SUM OF ALL RESPONSES TO PHQ QUESTIONS 1-9: 0
6. FEELING BAD ABOUT YOURSELF - OR THAT YOU ARE A FAILURE OR HAVE LET YOURSELF OR YOUR FAMILY DOWN: 0
3. TROUBLE FALLING OR STAYING ASLEEP: 0
7. TROUBLE CONCENTRATING ON THINGS, SUCH AS READING THE NEWSPAPER OR WATCHING TELEVISION: 0
SUM OF ALL RESPONSES TO PHQ9 QUESTIONS 1 & 2: 0
SUM OF ALL RESPONSES TO PHQ QUESTIONS 1-9: 0
10. IF YOU CHECKED OFF ANY PROBLEMS, HOW DIFFICULT HAVE THESE PROBLEMS MADE IT FOR YOU TO DO YOUR WORK, TAKE CARE OF THINGS AT HOME, OR GET ALONG WITH OTHER PEOPLE: 0
1. LITTLE INTEREST OR PLEASURE IN DOING THINGS: 0
1. LITTLE INTEREST OR PLEASURE IN DOING THINGS: 0
2. FEELING DOWN, DEPRESSED OR HOPELESS: 0
9. THOUGHTS THAT YOU WOULD BE BETTER OFF DEAD, OR OF HURTING YOURSELF: 0
SUM OF ALL RESPONSES TO PHQ QUESTIONS 1-9: 0
SUM OF ALL RESPONSES TO PHQ QUESTIONS 1-9: 0
4. FEELING TIRED OR HAVING LITTLE ENERGY: 0
SUM OF ALL RESPONSES TO PHQ QUESTIONS 1-9: 0
8. MOVING OR SPEAKING SO SLOWLY THAT OTHER PEOPLE COULD HAVE NOTICED. OR THE OPPOSITE, BEING SO FIGETY OR RESTLESS THAT YOU HAVE BEEN MOVING AROUND A LOT MORE THAN USUAL: 0
SUM OF ALL RESPONSES TO PHQ9 QUESTIONS 1 & 2: 0
2. FEELING DOWN, DEPRESSED OR HOPELESS: 0

## 2022-06-30 NOTE — PROGRESS NOTES
OFFICE PROGRESS NOTE  [address] [place] [address] Rd  [phone]  Dept: [phone]   Chief Complaint   Patient presents with    Diabetes    Health Maintenance     due for shingles, pne, tdap       ASSESSMENT/PLAN   1. Type 2 diabetes mellitus without complication, without long-term current use of insulin (HCC)  Assessment & Plan:   well controlled, no significant medication side effects noted and continue glipizide XL 5 mg daily, metformin 500 mg TID, labs reviewed with patient 3/22 CMP, CBCD, lipids   Monitor BS at different times: 1 day fasting, then next day 2 hours after lunch, next day 2 hours after dinner, next day at bedtime then start over and log all values. Bring log to next appointment  Foot exam every day; wash and dry well between toes, look for any redness, cracks, wounds notify provider if any problems occur  Reminder for annual Eye exam  Reminder for Podiatry visits Q 2 Months for toenail care if needed  Reminder to keep vaccines up dated  Exercise 30 minutes daily  Recommend Diabetic Education Classes if you have not already attended  Orders:  -     POCT glycosylated hemoglobin (Hb A1C)  -     POCT Glucose  2. Essential hypertension  Assessment & Plan:   well controlled, no significant medication side effects noted and labs reviewed from 3/22 CMP,. CBCD, lipids stable continue Amlodiipine 5mg daily  -Discussed taking medication and directed every day. -Discussed exercising daily 30 minutes 5 times a week for 150 minutes weekly.  -Discussed weight reduction if needed.  -Discussed low sodium diet.  -Discussed limiting caffeine consumption and tobacco cessation and the effects they have on the heart and blood pressure.    3. Mixed hyperlipidemia  Assessment & Plan:   well controlled, no significant medication side effects noted and Pravastatin 40 mg nightly.   -Discussed low fat diet, limit fast food, goodies, breads and pastas if consuming several days a week,  limit any alcohol consumption.  -Discussed weight reduction and exercise 30 minutes 5 days a week for total of 150 minutes weekly.  -Discussed if any unusual muscle aching/pain to contact the office, discussed medication and risk of muscle pain/damage from Rhabdomyolysis. -Discussed repeat labs in 8 weeks. 4. Mild emphysema (HCC)  Assessment & Plan:   Asymptomatic CT lung reviewed mild emphysema, she currently isn't using her inhalers. Declines any vaccines. Declines any vaccinations flu , pneumonia, or shingles    Reviewed labs: CMP, CBCD, Lipids, 3/2022    Reviewed radiology CT lung 2/11/22 mild emphysema    Discussed exercising 30 minutes daily and Discussed taking medications as directed and adverse effects    Return in about 6 months (around 12/30/2022), or if symptoms worsen or fail to improve, for DM, HTN, hyperlipidemia. HPI:     80 y.o. female presents today for follow-up of diabetes [place]. In-office bloodsugar is:   Results for POC orders placed in visit on 06/30/22   POCT glycosylated hemoglobin (Hb A1C)   Result Value Ref Range    Hemoglobin A1C 6.4 %   POCT Glucose   Result Value Ref Range    Glucose 132 mg/dL    QC OK? Patient reports being compliant to low carbohydrate diet and increasing weekly exercises. Currently, the patient is treated with medication(s): Glipizide XL 5 mg daily, metformin  mg TID. Patient reports not  checking home blood sugar. Patient denies hypoglycemic episodes and understand to take sweetened beverages or a snack if hypoglycemic symptoms are suspected. Hypertension: Patient here for follow-up of elevated blood pressure. She is exercising and is adherent to low salt diet. Blood pressure is well controlled at home. Cardiac symptoms none.  Patient denies chest pain, chest pressure/discomfort, claudication, dyspnea, exertional chest pressure/discomfort, fatigue, irregular heart beat, lower extremity edema, near-syncope, glipiZIDE (GLIPIZIDE XL) 5 MG extended release tablet, Take 1 tablet by mouth daily, Disp: 90 tablet, Rfl: 3    citalopram (CELEXA) 20 MG tablet, Take 1 tablet by mouth daily, Disp: 90 tablet, Rfl: 3    amLODIPine (NORVASC) 5 MG tablet, Take 1 tablet by mouth daily, Disp: 90 tablet, Rfl: 3    EPINEPHrine (EPIPEN) 0.3 MG/0.3ML SOAJ injection, inject as directed if needed FOR ANAPHYLAXIS ONCE, Disp: , Rfl:     aspirin 81 MG tablet, Take 81 mg by mouth every evening, Disp: , Rfl:       Surgical History:  has a past surgical history that includes Appendectomy; Carpal tunnel release; Tubal ligation; Bunionectomy; Hammer toe surgery; Cardiac catheterization; Total knee arthroplasty (May, 2015); and knee joint manipulation (Right, 6/18/15). Social History:  reports that she quit smoking about 14 years ago. Her smoking use included cigarettes. She has a 30.00 pack-year smoking history. She quit smokeless tobacco use about 16 years ago. She reports current alcohol use of about 2.5 standard drinks of alcohol per week. She reports that she does not use drugs. Family History: family history includes COPD in her paternal grandfather; Cancer in her brother and sister; Diabetes in her mother; Heart Attack in her father and paternal grandmother; Heart Disease (age of onset: 76) in her father; High Blood Pressure in her brother and sister; Kidney Disease in her mother; Radha Saver in her maternal grandmother; Gerhardt Done in her sister and son; Pneumonia in her maternal grandfather; Substance Abuse in her son. I have reviewed Cornelia's allergies, medications, problem list, medical, social and family history and have updated as needed in the electronic medical record    Review of Systems   Constitutional: Negative for activity change, appetite change, chills, diaphoresis, fatigue, fever and unexpected weight change.    HENT: Negative for congestion, dental problem, drooling, ear discharge, ear pain, facial swelling, hearing loss, mouth sores, nosebleeds, postnasal drip, rhinorrhea, sinus pressure, sinus pain, sneezing, sore throat, tinnitus, trouble swallowing and voice change. Eyes: Negative for visual disturbance. Respiratory: Negative for cough, chest tightness, shortness of breath and wheezing. Cardiovascular: Negative for chest pain, palpitations and leg swelling. Gastrointestinal: Negative for abdominal pain, constipation, diarrhea, nausea and vomiting. Endocrine: Negative for cold intolerance, heat intolerance, polydipsia, polyphagia and polyuria. Genitourinary: Negative for difficulty urinating, frequency and urgency. Musculoskeletal: Negative for arthralgias, back pain, gait problem, joint swelling, myalgias, neck pain and neck stiffness. Skin: Negative for color change, pallor, rash and wound. Allergic/Immunologic: Negative for environmental allergies, food allergies and immunocompromised state. Neurological: Negative for dizziness, tremors, seizures, syncope, facial asymmetry, speech difficulty, weakness, light-headedness, numbness and headaches. Hematological: Negative for adenopathy. Does not bruise/bleed easily. Psychiatric/Behavioral: Negative for agitation, behavioral problems, confusion, decreased concentration, dysphoric mood, hallucinations, self-injury, sleep disturbance and suicidal ideas. The patient is not nervous/anxious and is not hyperactive.         OBJECTIVE:     VS:  Wt Readings from Last 3 Encounters:   06/30/22 163 lb (73.9 kg)   06/21/22 162 lb (73.5 kg)   04/14/22 160 lb (72.6 kg)                       Vitals:    06/30/22 1415 06/30/22 1417 06/30/22 1458   BP: 124/78     Pulse: (!) 103 (!) 102 80   Resp: 16     Temp: 97.6 °F (36.4 °C)     SpO2: 98%     Weight: 163 lb (73.9 kg)     Height: 5' 7\" (1.702 m)         General: Alert and oriented to person, place, and time, well developed and well nourished, in no acute distress  SKIN: Warm and dry, intact without any rash, masses or lesions  HEAD: normocephalic, atraumatic  Eyes: sclera/conjunctiva clear, PERRLA, EOMI's intact  ENT: tympanic membranes, external ear and ear canal normal bilaterally, normal hearing, Nose without deformity, nasal mucosa and turbinates normal without polyps   Throat: clear, tongue midline, tonsils 1+, drainage, no masses or lesions noted, good dentition  Neck: supple and non-tender without mass, trachea midline, no cervical lymphadenopathy, no bruit, no thyromegaly or nodules  Cardiovascular: regular rate and regular rhythm, normal S1 and S2,  no murmurs, rubs, clicks, or gallop. Distal pulses intact, no carotid bruits. No edema  Pulmonary/Chest: clear to auscultation bilaterally, no wheezes, rales or rhonchi, normal air movement, no respiratory distress  Abdomen: soft, non-tender, non-distended, normal bowel sounds, no masses or hepatosplenomegaly  Musculoskeletal: Normal ROM, no joint swelling, deformity or tenderness   Neurologic: reflexes normal and symmetric, no cranial nerve deficit, gait, coordination and speech normal  Extremities: no clubbing, cyanosis, or edema. Psychiatric: Good eye contact, normal mood and affect, answers questions appropriately    I have reviewed my findings and recommendations with [facility].     Oscar Camacho, LOLY - CNP, NP-C, FNP-BC

## 2022-06-30 NOTE — ASSESSMENT & PLAN NOTE
well controlled, no significant medication side effects noted and labs reviewed from 3/22 CMP,. CBCD, lipids stable continue Amlodiipine 5mg daily  -Discussed taking medication and directed every day. -Discussed exercising daily 30 minutes 5 times a week for 150 minutes weekly.  -Discussed weight reduction if needed.  -Discussed low sodium diet.  -Discussed limiting caffeine consumption and tobacco cessation and the effects they have on the heart and blood pressure.

## 2022-06-30 NOTE — ASSESSMENT & PLAN NOTE
Asymptomatic CT lung reviewed mild emphysema, she currently isn't using her inhalers. Declines any vaccines.

## 2022-06-30 NOTE — ASSESSMENT & PLAN NOTE
well controlled, no significant medication side effects noted and Pravastatin 40 mg nightly.   -Discussed low fat diet, limit fast food, goodies, breads and pastas if consuming several days a week,  limit any alcohol consumption.  -Discussed weight reduction and exercise 30 minutes 5 days a week for total of 150 minutes weekly.  -Discussed if any unusual muscle aching/pain to contact the office, discussed medication and risk of muscle pain/damage from Rhabdomyolysis. -Discussed repeat labs in 8 weeks.

## 2022-06-30 NOTE — ASSESSMENT & PLAN NOTE
well controlled, no significant medication side effects noted and continue glipizide XL 5 mg daily, metformin 500 mg TID, labs reviewed with patient 3/22 CMP, CBCD, lipids   Monitor BS at different times: 1 day fasting, then next day 2 hours after lunch, next day 2 hours after dinner, next day at bedtime then start over and log all values.   Bring log to next appointment  Foot exam every day; wash and dry well between toes, look for any redness, cracks, wounds notify provider if any problems occur  Reminder for annual Eye exam  Reminder for Podiatry visits Q 2 Months for toenail care if needed  Reminder to keep vaccines up dated  Exercise 30 minutes daily  Recommend Diabetic Education Classes if you have not already attended

## 2022-07-20 ENCOUNTER — HOSPITAL ENCOUNTER (OUTPATIENT)
Dept: MAMMOGRAPHY | Age: 72
Discharge: HOME | End: 2022-07-22
Payer: MEDICARE

## 2022-07-20 VITALS — WEIGHT: 160 LBS | BODY MASS INDEX: 25.11 KG/M2 | HEIGHT: 67 IN

## 2022-07-20 DIAGNOSIS — Z12.31 SCREENING MAMMOGRAM FOR BREAST CANCER: ICD-10-CM

## 2022-07-20 DIAGNOSIS — Z78.0 ASYMPTOMATIC MENOPAUSAL STATE: ICD-10-CM

## 2022-07-20 PROCEDURE — 77067 SCR MAMMO BI INCL CAD: CPT

## 2022-07-20 PROCEDURE — 77080 DXA BONE DENSITY AXIAL: CPT

## 2023-01-03 ENCOUNTER — OFFICE VISIT (OUTPATIENT)
Dept: FAMILY MEDICINE CLINIC | Age: 73
End: 2023-01-03
Payer: MEDICARE

## 2023-01-03 VITALS
HEIGHT: 67 IN | HEART RATE: 104 BPM | TEMPERATURE: 97.1 F | BODY MASS INDEX: 26.82 KG/M2 | OXYGEN SATURATION: 94 % | RESPIRATION RATE: 16 BRPM | DIASTOLIC BLOOD PRESSURE: 72 MMHG | SYSTOLIC BLOOD PRESSURE: 124 MMHG | WEIGHT: 170.9 LBS

## 2023-01-03 DIAGNOSIS — E78.2 MIXED HYPERLIPIDEMIA: ICD-10-CM

## 2023-01-03 DIAGNOSIS — F32.5 MAJOR DEPRESSIVE DISORDER WITH SINGLE EPISODE, IN FULL REMISSION (HCC): ICD-10-CM

## 2023-01-03 DIAGNOSIS — E11.69 ONYCHOMYCOSIS OF MULTIPLE TOENAILS WITH TYPE 2 DIABETES MELLITUS (HCC): ICD-10-CM

## 2023-01-03 DIAGNOSIS — J43.9 MILD EMPHYSEMA (HCC): ICD-10-CM

## 2023-01-03 DIAGNOSIS — E11.9 TYPE 2 DIABETES MELLITUS WITHOUT COMPLICATION, WITHOUT LONG-TERM CURRENT USE OF INSULIN (HCC): Primary | ICD-10-CM

## 2023-01-03 DIAGNOSIS — I10 ESSENTIAL HYPERTENSION: ICD-10-CM

## 2023-01-03 DIAGNOSIS — B35.1 ONYCHOMYCOSIS OF MULTIPLE TOENAILS WITH TYPE 2 DIABETES MELLITUS (HCC): ICD-10-CM

## 2023-01-03 LAB
CHP ED QC CHECK: ABNORMAL
CREATININE URINE POCT: 50
GLUCOSE BLD-MCNC: 144 MG/DL
HBA1C MFR BLD: 6.6 %
MICROALBUMIN/CREAT 24H UR: 10 MG/G{CREAT}
MICROALBUMIN/CREAT UR-RTO: <30

## 2023-01-03 PROCEDURE — 1090F PRES/ABSN URINE INCON ASSESS: CPT | Performed by: NURSE PRACTITIONER

## 2023-01-03 PROCEDURE — 82044 UR ALBUMIN SEMIQUANTITATIVE: CPT | Performed by: NURSE PRACTITIONER

## 2023-01-03 PROCEDURE — G8427 DOCREV CUR MEDS BY ELIG CLIN: HCPCS | Performed by: NURSE PRACTITIONER

## 2023-01-03 PROCEDURE — 3074F SYST BP LT 130 MM HG: CPT | Performed by: NURSE PRACTITIONER

## 2023-01-03 PROCEDURE — 3078F DIAST BP <80 MM HG: CPT | Performed by: NURSE PRACTITIONER

## 2023-01-03 PROCEDURE — 1036F TOBACCO NON-USER: CPT | Performed by: NURSE PRACTITIONER

## 2023-01-03 PROCEDURE — G8399 PT W/DXA RESULTS DOCUMENT: HCPCS | Performed by: NURSE PRACTITIONER

## 2023-01-03 PROCEDURE — 99214 OFFICE O/P EST MOD 30 MIN: CPT | Performed by: NURSE PRACTITIONER

## 2023-01-03 PROCEDURE — 3017F COLORECTAL CA SCREEN DOC REV: CPT | Performed by: NURSE PRACTITIONER

## 2023-01-03 PROCEDURE — 82962 GLUCOSE BLOOD TEST: CPT | Performed by: NURSE PRACTITIONER

## 2023-01-03 PROCEDURE — 1123F ACP DISCUSS/DSCN MKR DOCD: CPT | Performed by: NURSE PRACTITIONER

## 2023-01-03 PROCEDURE — 2022F DILAT RTA XM EVC RTNOPTHY: CPT | Performed by: NURSE PRACTITIONER

## 2023-01-03 PROCEDURE — 3023F SPIROM DOC REV: CPT | Performed by: NURSE PRACTITIONER

## 2023-01-03 PROCEDURE — 3044F HG A1C LEVEL LT 7.0%: CPT | Performed by: NURSE PRACTITIONER

## 2023-01-03 PROCEDURE — G8484 FLU IMMUNIZE NO ADMIN: HCPCS | Performed by: NURSE PRACTITIONER

## 2023-01-03 PROCEDURE — 83036 HEMOGLOBIN GLYCOSYLATED A1C: CPT | Performed by: NURSE PRACTITIONER

## 2023-01-03 PROCEDURE — G8417 CALC BMI ABV UP PARAM F/U: HCPCS | Performed by: NURSE PRACTITIONER

## 2023-01-03 RX ORDER — GLUCOSAMINE HCL/CHONDROITIN SU 500-400 MG
CAPSULE ORAL
Qty: 100 STRIP | Refills: 3 | Status: SHIPPED | OUTPATIENT
Start: 2023-01-03

## 2023-01-03 RX ORDER — LANCETS 30 GAUGE
1 EACH MISCELLANEOUS DAILY
Qty: 100 EACH | Refills: 5 | Status: SHIPPED | OUTPATIENT
Start: 2023-01-03

## 2023-01-03 RX ORDER — BLOOD-GLUCOSE METER
1 KIT MISCELLANEOUS DAILY
Qty: 1 KIT | Refills: 0 | Status: SHIPPED | OUTPATIENT
Start: 2023-01-03

## 2023-01-03 ASSESSMENT — PATIENT HEALTH QUESTIONNAIRE - PHQ9
8. MOVING OR SPEAKING SO SLOWLY THAT OTHER PEOPLE COULD HAVE NOTICED. OR THE OPPOSITE, BEING SO FIGETY OR RESTLESS THAT YOU HAVE BEEN MOVING AROUND A LOT MORE THAN USUAL: 0
2. FEELING DOWN, DEPRESSED OR HOPELESS: 0
7. TROUBLE CONCENTRATING ON THINGS, SUCH AS READING THE NEWSPAPER OR WATCHING TELEVISION: 0
SUM OF ALL RESPONSES TO PHQ QUESTIONS 1-9: 0
5. POOR APPETITE OR OVEREATING: 0
6. FEELING BAD ABOUT YOURSELF - OR THAT YOU ARE A FAILURE OR HAVE LET YOURSELF OR YOUR FAMILY DOWN: 0
SUM OF ALL RESPONSES TO PHQ QUESTIONS 1-9: 0
SUM OF ALL RESPONSES TO PHQ9 QUESTIONS 1 & 2: 0
SUM OF ALL RESPONSES TO PHQ QUESTIONS 1-9: 0
SUM OF ALL RESPONSES TO PHQ QUESTIONS 1-9: 0
1. LITTLE INTEREST OR PLEASURE IN DOING THINGS: 0
3. TROUBLE FALLING OR STAYING ASLEEP: 0
9. THOUGHTS THAT YOU WOULD BE BETTER OFF DEAD, OR OF HURTING YOURSELF: 0
10. IF YOU CHECKED OFF ANY PROBLEMS, HOW DIFFICULT HAVE THESE PROBLEMS MADE IT FOR YOU TO DO YOUR WORK, TAKE CARE OF THINGS AT HOME, OR GET ALONG WITH OTHER PEOPLE: 0
4. FEELING TIRED OR HAVING LITTLE ENERGY: 0

## 2023-01-03 NOTE — ASSESSMENT & PLAN NOTE
well controlled, no significant medication side effects noted and fasting labs ordered for 2 weeks continue amlodipine 5 mg daily  -Discussed taking medication and directed every day. -Discussed exercising daily 30 minutes 5 times a week for 150 minutes weekly.  -Discussed weight reduction if needed.  -Discussed low sodium diet.  -Discussed limiting caffeine consumption and tobacco cessation and the effects they have on the heart and blood pressure.

## 2023-01-03 NOTE — PROGRESS NOTES
OFFICE PROGRESS NOTE  [address] [place] [address] Rd  [address] [phone]  Dept: [phone]   Chief Complaint   Patient presents with    Diabetes    Hypertension    Hyperlipidemia    Health Maintenance     Declined all vaccines, due for pneu, tdap, shingles, flu,        ASSESSMENT/PLAN   1. Type 2 diabetes mellitus without complication, without long-term current use of insulin ([facility])  Assessment & Plan:   well controlled, no significant medication side effects noted and A1c up slightly from 6.4% to 6.6% continue glipizide XL 5 mg daily, metformin  mg TID  Monitor BS at different times: 1 day fasting, then next day 2 hours after lunch, next day 2 hours after dinner, next day at bedtime then start over and log all values. Bring log to next appointment  Foot exam every day; wash and dry well between toes, look for any redness, cracks, wounds notify provider if any problems occur  Reminder for annual Eye exam  Reminder for Podiatry visits Q 2 Months for toenail care if needed  Reminder to keep vaccines up dated  Exercise 30 minutes daily  Recommend Diabetic Education Classes if you have not already attended  Orders:  -     POCT glycosylated hemoglobin (Hb A1C)  -     POCT Glucose  -     POCT Microalbumin  -      DIABETES FOOT EXAM  -     glucose monitoring (FREESTYLE FREEDOM) kit; DAILY Starting Tue 1/3/2023, Disp-1 kit, R-0, Normal  -     blood glucose monitor strips; Test 1 times a day & as needed for symptoms of irregular blood glucose due to hypoglycemia, Disp-100 strip, R-3, Normal  -     Lancets MISC; DAILY Starting Tue 1/3/2023, Disp-100 each, R-5, Normal  -     CBC with Auto Differential; Future  -     Comprehensive Metabolic Panel; Future  -     Lipid Panel; Future  -      DIABETES FOOT EXAM  2.  Essential hypertension  Assessment & Plan:   well controlled, no significant medication side effects noted and fasting labs ordered for 2 weeks continue amlodipine 5 mg daily  -Discussed taking medication and directed every day. -Discussed exercising daily 30 minutes 5 times a week for 150 minutes weekly.  -Discussed weight reduction if needed.  -Discussed low sodium diet.  -Discussed limiting caffeine consumption and tobacco cessation and the effects they have on the heart and blood pressure. 3. Mixed hyperlipidemia  Assessment & Plan:   well controlled, no significant medication side effects noted and fasting labs ordered, at this time continue Pravastatin 40 mg nightly.   -Discussed low fat diet, limit fast food, goodies, breads and pastas if consuming several days a week,  limit any alcohol consumption.  -Discussed weight reduction and exercise 30 minutes 5 days a week for total of 150 minutes weekly.  -Discussed if any unusual muscle aching/pain to contact the office, discussed medication and risk of muscle pain/damage from Rhabdomyolysis. -Discussed repeat labs in 2 weeks. 4. Mild emphysema ([facility] [address]  Assessment & Plan:   Stable at this time. She declines any inhalers and also declines all vaccinations. 5. Major depressive disorder with single episode, in full remission [facility])  Assessment & Plan:   Well controlled on current treatment Citalopram 20 mg daily. Fasting labs ordered for 2 weeks. Denies any suicidal or homicidal ideations. 6. Onychomycosis of multiple toenails with type 2 diabetes mellitus ([facility] [address]  Assessment & Plan:   Stable declines to see podiatry at this time. Discussed lamisil but she declines due to lab needed while on treatment. Discussed white vinegar soaks 5 minutes and rinse well, wash with soap and water pat dry use O'Lencho's foot cream, kerasol treatment OTC.    Orders:  -      DIABETES FOOT EXAM       Reviewed labs: Lipids,  Reviewed notes from DR SIGNATURE [facility] 2/2022  Reviewed radiology DEXA 3/2022    Discussed reducing caffeine intake, \"smoking cessation,  weight loss,  exercising 30 minutes daily, and taking medications as directed and adverse effects    No follow-ups on file. HPI:   SUBJECTIVE:     71 y.o. female presents today for follow-up of diabetes [place]ren[place]. In-office bloodsugar is:   Results for POC orders placed in visit on 01/03/23   POCT Microalbumin   Result Value Ref Range    Microalb, Ur 10     Creatinine Ur POCT 50     Microalbumin Creatinine Ratio <30    POCT glycosylated hemoglobin (Hb A1C)   Result Value Ref Range    Hemoglobin A1C 6.6 %   POCT Glucose   Result Value Ref Range    Glucose 144 mg/dL    QC OK? Patient reports being compliant to low carbohydrate diet and increasing weekly exercises. Currently, the patient is treated with medication(s): Glipizide XL 5 mg daily, metformin  mg TID. Faith Ruiz Patient reports not checking home blood sugar. Patient admits to hypoglycemic episodes and understand to take sweetened beverages or a snack if hypoglycemic symptoms are suspected. Hypertension: Patient here for follow-up of elevated blood pressure. She is exercising and is adherent to low salt diet. Blood pressure is well controlled at home. Cardiac symptoms none. Patient denies chest pain, chest pressure/discomfort, claudication, exertional chest pressure/discomfort, fatigue, irregular heart beat, lower extremity edema, near-syncope, orthopnea, palpitations, paroxysmal nocturnal dyspnea, syncope, and tachypnea. Cardiovascular risk factors: advanced age (older than 54 for men, 72 for women), diabetes mellitus, dyslipidemia, hypertension, and smoking/ tobacco exposure. Use of agents associated with hypertension: none. History of target organ damage: none. Hyperlipidemia: Patient presents with hyperlipidemia. She was tested because DM, HTN, HLD. Her last labs 3/21/22  showed Total cholesterol of 176, HDL 58, LDL 86,  Triglycerides 158. Denies chest pain, exertional chest pressure/discomfort, fatigue, feeding intolerance, lower extremity edema, palpitations, poor exercise tolerance, syncope, tachypnea, and skin xanthelasma. There is not a family history of hyperlipidemia. There is not a family history of early ischemia heart disease. She states her depression is doing well on the Citalopram and she has 2 memorial spots in her home for her sons and she just tries not to linger too long. She denies any homicide or suicidal ideations. She has adequate support at home. Denies any adverse effects of the medication. Emphysema has been stable. She does admit to occasional SOB but declines to take any inhalers. She did see podiatry and said he was going to send in a cream for her feet but didn't . She did have trauma to the left great toe many years ago and so the toenail was never the same. Discussed using lamisil and she declines today. Doesn't want to see podiatry. Surgical History:  has a past surgical history that includes Appendectomy; Carpal tunnel release; Tubal ligation; Bunionectomy; Hammer toe surgery; Cardiac catheterization; Total knee arthroplasty (May, 2015); and knee joint manipulation (Right, 6/18/15). Social History:  reports that she quit smoking about 15 years ago. Her smoking use included cigarettes. She has a 30.00 pack-year smoking history. She quit smokeless tobacco use about 16 years ago. She reports current alcohol use of about 3.0 standard drinks per week. She reports that she does not use drugs. Family History: family history includes COPD in her paternal grandfather; Cancer in her brother and sister; Diabetes in her mother; Heart Attack in her father and paternal grandmother; Heart Disease (age of onset: 76) in her father; High Blood Pressure in her brother and sister; Kidney Disease in her mother; Delaney Query in her maternal grandmother; Chi Sours in her sister and son; Pneumonia in her maternal grandfather; Substance Abuse in her son.   I have reviewed Cornelia's allergies, medications, problem list, medical, social and family history and have updated as needed in the electronic medical record    Review of Systems    OBJECTIVE:     VS:  Wt Readings from Last 3 Encounters:   01/03/23 170 lb 14.4 oz (77.5 kg)   07/20/22 160 lb (72.6 kg)   06/30/22 163 lb (73.9 kg)                       Vitals:    01/03/23 0923 01/03/23 0928   BP: 124/72    Pulse: (!) 106 (!) 104   Resp: 16    Temp: 97.1 °F (36.2 °C)    SpO2: 94%    Weight: 170 lb 14.4 oz (77.5 kg)    Height: 5' 7\" (1.702 m)        General: Alert and oriented to person, place, and time, well developed and well nourished, in no acute distress  SKIN: Warm and dry, intact without any rash, masses or lesions  HEAD: normocephalic, atraumatic  Eyes: sclera/conjunctiva clear, PERRLA, EOMI's intact  ENT: tympanic membranes, external ear and ear canal normal bilaterally, normal hearing, Nose without deformity, nasal mucosa and turbinates normal without polyps   Throat: clear, tongue midline, no drainage,  no masses or lesions noted, upper denture and lower natural teeth. Neck: supple and non-tender without mass, trachea midline, no cervical lymphadenopathy, no bruit, no thyromegaly or nodules  Cardiovascular: regular rate and regular rhythm, normal S1 and S2,  no murmurs, rubs, clicks, or gallop. Distal pulses intact, no carotid bruits. No edema  Pulmonary/Chest: clear to auscultation bilaterally, no wheezes, rales or rhonchi, normal air movement, no respiratory distress  Abdomen: soft, non-tender, non-distended, normal bowel sounds, no masses or hepatosplenomegaly  Musculoskeletal: Normal ROM, no joint swelling, deformity or tenderness   Neurologic: reflexes normal and symmetric, no cranial nerve deficit, gait, coordination and speech normal  Extremities: no clubbing, cyanosis, or edema. Psychiatric: Good eye contact, normal mood and affect, answers questions appropriately  Foot Exam:  No signs of infection and/or necrosis noted. Sensation intact and equal bilaterally.   Monofilament normal bilateral,  Interdigit spaces exhibit no abnormal skin changes and/or surface growth. I have reviewed my findings and recommendations with Dinah Pearson.     Kayla Apo, APRN - CNP, NP-C, FNP-BC

## 2023-01-03 NOTE — ASSESSMENT & PLAN NOTE
well controlled, no significant medication side effects noted and fasting labs ordered, at this time continue Pravastatin 40 mg nightly.   -Discussed low fat diet, limit fast food, goodies, breads and pastas if consuming several days a week,  limit any alcohol consumption.  -Discussed weight reduction and exercise 30 minutes 5 days a week for total of 150 minutes weekly.  -Discussed if any unusual muscle aching/pain to contact the office, discussed medication and risk of muscle pain/damage from Rhabdomyolysis. -Discussed repeat labs in 2 weeks.

## 2023-01-03 NOTE — ASSESSMENT & PLAN NOTE
well controlled, no significant medication side effects noted and A1c up slightly from 6.4% to 6.6% continue glipizide XL 5 mg daily, metformin  mg TID  Monitor BS at different times: 1 day fasting, then next day 2 hours after lunch, next day 2 hours after dinner, next day at bedtime then start over and log all values.   Bring log to next appointment  Foot exam every day; wash and dry well between toes, look for any redness, cracks, wounds notify provider if any problems occur  Reminder for annual Eye exam  Reminder for Podiatry visits Q 2 Months for toenail care if needed  Reminder to keep vaccines up dated  Exercise 30 minutes daily  Recommend Diabetic Education Classes if you have not already attended

## 2023-01-03 NOTE — ASSESSMENT & PLAN NOTE
Stable declines to see podiatry at this time. Discussed lamisil but she declines due to lab needed while on treatment. Discussed white vinegar soaks 5 minutes and rinse well, wash with soap and water pat dry use O'Lencho's foot cream, kerasol treatment OTC.

## 2023-01-03 NOTE — ASSESSMENT & PLAN NOTE
Well controlled on current treatment Citalopram 20 mg daily. Fasting labs ordered for 2 weeks. Denies any suicidal or homicidal ideations.

## 2023-01-04 ENCOUNTER — HOSPITAL ENCOUNTER (OUTPATIENT)
Age: 73
Discharge: HOME | End: 2023-01-04
Payer: MEDICARE

## 2023-01-04 DIAGNOSIS — E11.9 TYPE 2 DIABETES MELLITUS WITHOUT COMPLICATION, WITHOUT LONG-TERM CURRENT USE OF INSULIN (HCC): ICD-10-CM

## 2023-01-04 LAB
ALBUMIN SERPL-MCNC: 4.3 G/DL (ref 3.5–5.2)
ALP BLD-CCNC: 89 U/L (ref 35–104)
ALT SERPL-CCNC: 21 U/L (ref 0–32)
ANION GAP SERPL CALCULATED.3IONS-SCNC: 12 MMOL/L (ref 7–16)
AST SERPL-CCNC: 23 U/L (ref 0–31)
BASOPHILS ABSOLUTE: 0.06 E9/L (ref 0–0.2)
BASOPHILS RELATIVE PERCENT: 0.9 % (ref 0–2)
BILIRUB SERPL-MCNC: 0.3 MG/DL (ref 0–1.2)
BUN BLDV-MCNC: 9 MG/DL (ref 6–23)
CALCIUM SERPL-MCNC: 9.9 MG/DL (ref 8.6–10.2)
CHLORIDE BLD-SCNC: 100 MMOL/L (ref 98–107)
CHOLESTEROL, TOTAL: 165 MG/DL (ref 0–199)
CO2: 26 MMOL/L (ref 22–29)
CREAT SERPL-MCNC: 0.7 MG/DL (ref 0.5–1)
EOSINOPHILS ABSOLUTE: 0.61 E9/L (ref 0.05–0.5)
EOSINOPHILS RELATIVE PERCENT: 9.5 % (ref 0–6)
GFR SERPL CREATININE-BSD FRML MDRD: >60 ML/MIN/1.73
GLUCOSE BLD-MCNC: 105 MG/DL (ref 74–99)
HCT VFR BLD CALC: 39 % (ref 34–48)
HDLC SERPL-MCNC: 58 MG/DL
HEMOGLOBIN: 12.5 G/DL (ref 11.5–15.5)
HYPOCHROMIA: ABNORMAL
IMMATURE GRANULOCYTES #: 0.02 E9/L
IMMATURE GRANULOCYTES %: 0.3 % (ref 0–5)
LDL CHOLESTEROL CALCULATED: 68 MG/DL (ref 0–99)
LYMPHOCYTES ABSOLUTE: 2.06 E9/L (ref 1.5–4)
LYMPHOCYTES RELATIVE PERCENT: 32.2 % (ref 20–42)
MCH RBC QN AUTO: 26.6 PG (ref 26–35)
MCHC RBC AUTO-ENTMCNC: 32.1 % (ref 32–34.5)
MCV RBC AUTO: 83 FL (ref 80–99.9)
MONOCYTES ABSOLUTE: 0.47 E9/L (ref 0.1–0.95)
MONOCYTES RELATIVE PERCENT: 7.3 % (ref 2–12)
NEUTROPHILS ABSOLUTE: 3.18 E9/L (ref 1.8–7.3)
NEUTROPHILS RELATIVE PERCENT: 49.8 % (ref 43–80)
OVALOCYTES: ABNORMAL
PDW BLD-RTO: 14.6 FL (ref 11.5–15)
PLATELET # BLD: 164 E9/L (ref 130–450)
PMV BLD AUTO: 11 FL (ref 7–12)
POIKILOCYTES: ABNORMAL
POTASSIUM SERPL-SCNC: 4.5 MMOL/L (ref 3.5–5)
RBC # BLD: 4.7 E12/L (ref 3.5–5.5)
SODIUM BLD-SCNC: 138 MMOL/L (ref 132–146)
TOTAL PROTEIN: 7.7 G/DL (ref 6.4–8.3)
TRIGL SERPL-MCNC: 193 MG/DL (ref 0–149)
VLDLC SERPL CALC-MCNC: 39 MG/DL
WBC # BLD: 6.4 E9/L (ref 4.5–11.5)

## 2023-01-04 PROCEDURE — 85025 COMPLETE CBC W/AUTO DIFF WBC: CPT

## 2023-01-04 PROCEDURE — 80061 LIPID PANEL: CPT

## 2023-01-04 PROCEDURE — 36415 COLL VENOUS BLD VENIPUNCTURE: CPT

## 2023-01-04 PROCEDURE — 80053 COMPREHEN METABOLIC PANEL: CPT

## 2023-01-05 NOTE — RESULT ENCOUNTER NOTE
Please notify patient that their lab results are stable, triglycerides are higher at 193 up from 158 watch the carb intake.

## 2023-03-13 DIAGNOSIS — E11.9 TYPE 2 DIABETES MELLITUS WITHOUT COMPLICATION, WITHOUT LONG-TERM CURRENT USE OF INSULIN (HCC): ICD-10-CM

## 2023-03-13 DIAGNOSIS — K21.9 GASTROESOPHAGEAL REFLUX DISEASE WITHOUT ESOPHAGITIS: ICD-10-CM

## 2023-03-13 DIAGNOSIS — E78.2 MIXED HYPERLIPIDEMIA: ICD-10-CM

## 2023-03-13 DIAGNOSIS — F32.5 MAJOR DEPRESSIVE DISORDER WITH SINGLE EPISODE, IN FULL REMISSION (HCC): ICD-10-CM

## 2023-03-13 RX ORDER — METFORMIN HYDROCHLORIDE 500 MG/1
TABLET, EXTENDED RELEASE ORAL
Qty: 270 TABLET | Refills: 3 | OUTPATIENT
Start: 2023-03-13

## 2023-03-13 RX ORDER — CITALOPRAM 20 MG/1
TABLET ORAL
Qty: 90 TABLET | Refills: 3 | OUTPATIENT
Start: 2023-03-13

## 2023-03-13 RX ORDER — OMEPRAZOLE 40 MG/1
CAPSULE, DELAYED RELEASE ORAL
Qty: 90 CAPSULE | Refills: 3 | OUTPATIENT
Start: 2023-03-13

## 2023-03-13 RX ORDER — GLIPIZIDE 5 MG/1
TABLET, FILM COATED, EXTENDED RELEASE ORAL
Qty: 90 TABLET | Refills: 3 | OUTPATIENT
Start: 2023-03-13

## 2023-03-13 RX ORDER — PRAVASTATIN SODIUM 40 MG
TABLET ORAL
Qty: 90 TABLET | Refills: 3 | OUTPATIENT
Start: 2023-03-13

## 2023-03-22 DIAGNOSIS — F32.5 MAJOR DEPRESSIVE DISORDER WITH SINGLE EPISODE, IN FULL REMISSION (HCC): ICD-10-CM

## 2023-03-22 DIAGNOSIS — E11.9 TYPE 2 DIABETES MELLITUS WITHOUT COMPLICATION, WITHOUT LONG-TERM CURRENT USE OF INSULIN (HCC): ICD-10-CM

## 2023-03-22 DIAGNOSIS — E78.2 MIXED HYPERLIPIDEMIA: ICD-10-CM

## 2023-03-22 DIAGNOSIS — K21.9 GASTROESOPHAGEAL REFLUX DISEASE WITHOUT ESOPHAGITIS: ICD-10-CM

## 2023-03-22 RX ORDER — GLIPIZIDE 5 MG/1
5 TABLET, FILM COATED, EXTENDED RELEASE ORAL DAILY
Qty: 90 TABLET | Refills: 1 | Status: SHIPPED | OUTPATIENT
Start: 2023-03-22

## 2023-03-22 RX ORDER — METFORMIN HYDROCHLORIDE 500 MG/1
500 TABLET, EXTENDED RELEASE ORAL
Qty: 270 TABLET | Refills: 1 | Status: SHIPPED | OUTPATIENT
Start: 2023-03-22 | End: 2024-03-16

## 2023-03-22 RX ORDER — OMEPRAZOLE 40 MG/1
40 CAPSULE, DELAYED RELEASE ORAL DAILY
Qty: 90 CAPSULE | Refills: 1 | Status: SHIPPED | OUTPATIENT
Start: 2023-03-22

## 2023-03-22 RX ORDER — PRAVASTATIN SODIUM 40 MG
40 TABLET ORAL DAILY
Qty: 90 TABLET | Refills: 1 | Status: SHIPPED | OUTPATIENT
Start: 2023-03-22

## 2023-03-22 RX ORDER — CITALOPRAM 20 MG/1
20 TABLET ORAL DAILY
Qty: 90 TABLET | Refills: 1 | Status: SHIPPED | OUTPATIENT
Start: 2023-03-22

## 2023-03-22 NOTE — TELEPHONE ENCOUNTER
Patient called for refills which need to be sent to her mail order pharmacy. Patient informed she will not have enough medication to get her through to her appt.       Last seen 1/3/2023  Next appt 4/3/2023  [facility]

## 2023-04-03 ENCOUNTER — OFFICE VISIT (OUTPATIENT)
Dept: FAMILY MEDICINE CLINIC | Age: 73
End: 2023-04-03
Payer: MEDICARE

## 2023-04-03 VITALS
OXYGEN SATURATION: 98 % | BODY MASS INDEX: 25.61 KG/M2 | SYSTOLIC BLOOD PRESSURE: 130 MMHG | TEMPERATURE: 97.5 F | HEART RATE: 97 BPM | HEIGHT: 68 IN | DIASTOLIC BLOOD PRESSURE: 76 MMHG | WEIGHT: 169 LBS | RESPIRATION RATE: 16 BRPM

## 2023-04-03 DIAGNOSIS — Z28.21 VACCINATION DECLINED BY PATIENT: ICD-10-CM

## 2023-04-03 DIAGNOSIS — Z87.891 PERSONAL HISTORY OF TOBACCO USE: ICD-10-CM

## 2023-04-03 DIAGNOSIS — Z00.00 MEDICARE ANNUAL WELLNESS VISIT, SUBSEQUENT: Primary | ICD-10-CM

## 2023-04-03 DIAGNOSIS — I10 ESSENTIAL HYPERTENSION: ICD-10-CM

## 2023-04-03 PROCEDURE — 1123F ACP DISCUSS/DSCN MKR DOCD: CPT | Performed by: NURSE PRACTITIONER

## 2023-04-03 PROCEDURE — G0296 VISIT TO DETERM LDCT ELIG: HCPCS | Performed by: NURSE PRACTITIONER

## 2023-04-03 PROCEDURE — 3078F DIAST BP <80 MM HG: CPT | Performed by: NURSE PRACTITIONER

## 2023-04-03 PROCEDURE — 3017F COLORECTAL CA SCREEN DOC REV: CPT | Performed by: NURSE PRACTITIONER

## 2023-04-03 PROCEDURE — G0439 PPPS, SUBSEQ VISIT: HCPCS | Performed by: NURSE PRACTITIONER

## 2023-04-03 PROCEDURE — 3075F SYST BP GE 130 - 139MM HG: CPT | Performed by: NURSE PRACTITIONER

## 2023-04-03 RX ORDER — AMLODIPINE BESYLATE 5 MG/1
5 TABLET ORAL DAILY
Qty: 90 TABLET | Refills: 0 | Status: SHIPPED | OUTPATIENT
Start: 2023-04-03

## 2023-04-03 SDOH — ECONOMIC STABILITY: FOOD INSECURITY: WITHIN THE PAST 12 MONTHS, YOU WORRIED THAT YOUR FOOD WOULD RUN OUT BEFORE YOU GOT MONEY TO BUY MORE.: NEVER TRUE

## 2023-04-03 SDOH — ECONOMIC STABILITY: FOOD INSECURITY: WITHIN THE PAST 12 MONTHS, THE FOOD YOU BOUGHT JUST DIDN'T LAST AND YOU DIDN'T HAVE MONEY TO GET MORE.: NEVER TRUE

## 2023-04-03 SDOH — ECONOMIC STABILITY: INCOME INSECURITY: HOW HARD IS IT FOR YOU TO PAY FOR THE VERY BASICS LIKE FOOD, HOUSING, MEDICAL CARE, AND HEATING?: NOT HARD AT ALL

## 2023-04-03 ASSESSMENT — PATIENT HEALTH QUESTIONNAIRE - PHQ9
7. TROUBLE CONCENTRATING ON THINGS, SUCH AS READING THE NEWSPAPER OR WATCHING TELEVISION: 0
SUM OF ALL RESPONSES TO PHQ QUESTIONS 1-9: 0
8. MOVING OR SPEAKING SO SLOWLY THAT OTHER PEOPLE COULD HAVE NOTICED. OR THE OPPOSITE, BEING SO FIGETY OR RESTLESS THAT YOU HAVE BEEN MOVING AROUND A LOT MORE THAN USUAL: 0
5. POOR APPETITE OR OVEREATING: 0
10. IF YOU CHECKED OFF ANY PROBLEMS, HOW DIFFICULT HAVE THESE PROBLEMS MADE IT FOR YOU TO DO YOUR WORK, TAKE CARE OF THINGS AT HOME, OR GET ALONG WITH OTHER PEOPLE: 0
9. THOUGHTS THAT YOU WOULD BE BETTER OFF DEAD, OR OF HURTING YOURSELF: 0
6. FEELING BAD ABOUT YOURSELF - OR THAT YOU ARE A FAILURE OR HAVE LET YOURSELF OR YOUR FAMILY DOWN: 0
3. TROUBLE FALLING OR STAYING ASLEEP: 0
SUM OF ALL RESPONSES TO PHQ QUESTIONS 1-9: 0
SUM OF ALL RESPONSES TO PHQ9 QUESTIONS 1 & 2: 0
2. FEELING DOWN, DEPRESSED OR HOPELESS: 0
SUM OF ALL RESPONSES TO PHQ QUESTIONS 1-9: 0
4. FEELING TIRED OR HAVING LITTLE ENERGY: 0
1. LITTLE INTEREST OR PLEASURE IN DOING THINGS: 0
SUM OF ALL RESPONSES TO PHQ QUESTIONS 1-9: 0

## 2023-04-03 NOTE — PATIENT INSTRUCTIONS
side and on the cheek side. Pay special attention to the front teeth and all surfaces of the back teeth. Brush chewing surfaces with short back-and-forth strokes. After you've finished, help the person rinse the remaining toothpaste from their mouth. Where can you learn more? Go to http://www.woods.com/ and enter F944 to learn more about \"Learning About Dental Care for Older Adults. \"  Current as of: November 14, 2022               Content Version: 13.6  © 2006-2023 [employer]. Care instructions adapted under license by [facility] ([facility]). If you have questions about a medical condition or this instruction, always ask your healthcare professional. [place] [address] any warranty or liability for your use of this information. A Healthy Heart: Care Instructions  Your Care Instructions     Coronary artery disease, also called heart disease, occurs when a substance called plaque builds up in the vessels that supply oxygen-rich blood to your heart muscle. This can narrow the blood vessels and reduce blood flow. A heart attack happens when blood flow is completely blocked. A high-fat diet, smoking, and other factors increase the risk of heart disease. Your doctor has found that you have a chance of having heart disease. You can do lots of things to keep your heart healthy. It may not be easy, but you can change your diet, exercise more, and quit smoking. These steps really work to lower your chance of heart disease. Follow-up care is a key part of your treatment and safety. Be sure to make and go to all appointments, and call your doctor if you are having problems. It's also a good idea to know your test results and keep a list of the medicines you take. How can you care for yourself at home? Diet    Use less salt when you cook and eat. This helps lower your blood pressure. Taste food before salting. Add only a little salt when you think you need it.  With time,

## 2023-04-03 NOTE — PROGRESS NOTES
each by Does not apply route daily Yes LOLY Jay CNP   amLODIPine (NORVASC) 5 MG tablet Take 1 tablet by mouth daily Yes LOLY Jay CNP   EPINEPHrine (EPIPEN) 0.3 MG/0.3ML SOAJ injection inject as directed if needed FOR ANAPHYLAXIS ONCE Yes Historical Provider, MD   aspirin 81 MG tablet Take 1 tablet by mouth every evening Yes Historical Provider, MD [place] (Including outside providers/suppliers regularly involved in providing care):   Patient Care Team:  LOLY Jay CNP as PCP - General (Family Nurse Practitioner)  LOLY Jay CNP as PCP - Empaneled Provider     Reviewed and updated this visit:  Tobacco  Allergies  Meds  Problems  Med Hx  Surg Hx  Soc Hx  Fam Hx               LOLY Menendez CNP Discussed with the patient the current USPSTF guidelines released March 9, 2021 for screening for lung cancer. For adults aged 48 to [de-identified] years who have a 20 pack-year smoking history and currently smoke or have quit within the past 15 years the grade B recommendation is to:  Screen for lung cancer with low-dose computed tomography (LDCT) every year. Stop screening once a person has not smoked for 15 years or has a health problem that limits life expectancy or the ability to have lung surgery. The patient  reports that she quit smoking about 15 years ago. Her smoking use included cigarettes. She has a 30.00 pack-year smoking history. She quit smokeless tobacco use about 17 years ago. . Discussed with patient the risks and benefits of screening, including over-diagnosis, false positive rate, and total radiation exposure. The patient currently exhibits no signs or symptoms suggestive of lung cancer. Discussed with patient the importance of compliance with yearly annual lung cancer screenings and willingness to undergo diagnosis and treatment if screening scan is positive.   In addition, the patient was counseled regarding the importance of remaining smoke

## 2023-05-03 ENCOUNTER — OFFICE VISIT (OUTPATIENT)
Dept: FAMILY MEDICINE CLINIC | Age: 73
End: 2023-05-03
Payer: MEDICARE

## 2023-05-03 VITALS
DIASTOLIC BLOOD PRESSURE: 80 MMHG | HEIGHT: 67 IN | OXYGEN SATURATION: 96 % | SYSTOLIC BLOOD PRESSURE: 120 MMHG | BODY MASS INDEX: 26.68 KG/M2 | WEIGHT: 170 LBS | HEART RATE: 98 BPM | TEMPERATURE: 98 F

## 2023-05-03 DIAGNOSIS — R79.89 ABNORMAL CBC MEASUREMENT: ICD-10-CM

## 2023-05-03 DIAGNOSIS — E78.2 MIXED HYPERLIPIDEMIA: ICD-10-CM

## 2023-05-03 DIAGNOSIS — E11.9 TYPE 2 DIABETES MELLITUS WITHOUT COMPLICATION, WITHOUT LONG-TERM CURRENT USE OF INSULIN (HCC): Primary | ICD-10-CM

## 2023-05-03 DIAGNOSIS — Z28.21 VACCINATION DECLINED BY PATIENT: ICD-10-CM

## 2023-05-03 DIAGNOSIS — F33.0 MAJOR DEPRESSIVE DISORDER, RECURRENT, MILD (HCC): ICD-10-CM

## 2023-05-03 DIAGNOSIS — I10 ESSENTIAL HYPERTENSION: ICD-10-CM

## 2023-05-03 LAB
BASOPHILS # BLD: 0.06 E9/L (ref 0–0.2)
BASOPHILS NFR BLD: 0.8 % (ref 0–2)
CHP ED QC CHECK: ABNORMAL
EOSINOPHIL # BLD: 0.39 E9/L (ref 0.05–0.5)
EOSINOPHIL NFR BLD: 5.2 % (ref 0–6)
ERYTHROCYTE [DISTWIDTH] IN BLOOD BY AUTOMATED COUNT: 15 FL (ref 11.5–15)
GLUCOSE BLD-MCNC: 164 MG/DL
HBA1C MFR BLD: 6.3 %
HCT VFR BLD AUTO: 40.5 % (ref 34–48)
HGB BLD-MCNC: 12.3 G/DL (ref 11.5–15.5)
IMM GRANULOCYTES # BLD: 0.02 E9/L
IMM GRANULOCYTES NFR BLD: 0.3 % (ref 0–5)
LYMPHOCYTES # BLD: 2.46 E9/L (ref 1.5–4)
LYMPHOCYTES NFR BLD: 32.9 % (ref 20–42)
MCH RBC QN AUTO: 26.5 PG (ref 26–35)
MCHC RBC AUTO-ENTMCNC: 30.4 % (ref 32–34.5)
MCV RBC AUTO: 87.1 FL (ref 80–99.9)
MONOCYTES # BLD: 0.68 E9/L (ref 0.1–0.95)
MONOCYTES NFR BLD: 9.1 % (ref 2–12)
NEUTROPHILS # BLD: 3.87 E9/L (ref 1.8–7.3)
NEUTS SEG NFR BLD: 51.7 % (ref 43–80)
PLATELET # BLD AUTO: 188 E9/L (ref 130–450)
PMV BLD AUTO: 11.9 FL (ref 7–12)
RBC # BLD AUTO: 4.65 E12/L (ref 3.5–5.5)
WBC # BLD: 7.5 E9/L (ref 4.5–11.5)

## 2023-05-03 PROCEDURE — G8399 PT W/DXA RESULTS DOCUMENT: HCPCS | Performed by: NURSE PRACTITIONER

## 2023-05-03 PROCEDURE — 3044F HG A1C LEVEL LT 7.0%: CPT | Performed by: NURSE PRACTITIONER

## 2023-05-03 PROCEDURE — 3017F COLORECTAL CA SCREEN DOC REV: CPT | Performed by: NURSE PRACTITIONER

## 2023-05-03 PROCEDURE — 3079F DIAST BP 80-89 MM HG: CPT | Performed by: NURSE PRACTITIONER

## 2023-05-03 PROCEDURE — 2022F DILAT RTA XM EVC RTNOPTHY: CPT | Performed by: NURSE PRACTITIONER

## 2023-05-03 PROCEDURE — G8427 DOCREV CUR MEDS BY ELIG CLIN: HCPCS | Performed by: NURSE PRACTITIONER

## 2023-05-03 PROCEDURE — 1090F PRES/ABSN URINE INCON ASSESS: CPT | Performed by: NURSE PRACTITIONER

## 2023-05-03 PROCEDURE — 3074F SYST BP LT 130 MM HG: CPT | Performed by: NURSE PRACTITIONER

## 2023-05-03 PROCEDURE — 82962 GLUCOSE BLOOD TEST: CPT | Performed by: NURSE PRACTITIONER

## 2023-05-03 PROCEDURE — 1123F ACP DISCUSS/DSCN MKR DOCD: CPT | Performed by: NURSE PRACTITIONER

## 2023-05-03 PROCEDURE — 83036 HEMOGLOBIN GLYCOSYLATED A1C: CPT | Performed by: NURSE PRACTITIONER

## 2023-05-03 PROCEDURE — 1036F TOBACCO NON-USER: CPT | Performed by: NURSE PRACTITIONER

## 2023-05-03 PROCEDURE — 99214 OFFICE O/P EST MOD 30 MIN: CPT | Performed by: NURSE PRACTITIONER

## 2023-05-03 PROCEDURE — G8417 CALC BMI ABV UP PARAM F/U: HCPCS | Performed by: NURSE PRACTITIONER

## 2023-05-03 RX ORDER — LANCETS 30 GAUGE
1 EACH MISCELLANEOUS DAILY
Qty: 100 EACH | Refills: 5 | Status: SHIPPED | OUTPATIENT
Start: 2023-05-03

## 2023-05-03 RX ORDER — AMLODIPINE BESYLATE 5 MG/1
5 TABLET ORAL DAILY
Qty: 90 TABLET | Refills: 1 | Status: SHIPPED | OUTPATIENT
Start: 2023-05-03

## 2023-05-03 RX ORDER — GLUCOSAMINE HCL/CHONDROITIN SU 500-400 MG
CAPSULE ORAL
Qty: 100 STRIP | Refills: 3 | Status: SHIPPED | OUTPATIENT
Start: 2023-05-03

## 2023-05-03 ASSESSMENT — ENCOUNTER SYMPTOMS
BACK PAIN: 0
VOMITING: 1
WHEEZING: 0
SHORTNESS OF BREATH: 0
CONSTIPATION: 0
COLOR CHANGE: 0
SORE THROAT: 0
FACIAL SWELLING: 0
VOICE CHANGE: 0
CHEST TIGHTNESS: 0
TROUBLE SWALLOWING: 0
COUGH: 0
NAUSEA: 0
DIARRHEA: 1
SINUS PRESSURE: 0
SINUS PAIN: 0
ABDOMINAL PAIN: 0
RHINORRHEA: 0

## 2023-05-03 NOTE — PROGRESS NOTES
edema. Psychiatric: Good eye contact, normal mood and affect, answers questions appropriately      I have reviewed my findings and recommendations with Mark Thomas.     Nini Cook, APRN - CNP, NP-C, FNP-BC

## 2023-05-03 NOTE — ASSESSMENT & PLAN NOTE
Well controlled on the Citalopram 20 mg daily.  Labs reviewed and unremarkable except for elevated eosinophil recheck lab today

## 2023-05-03 NOTE — ASSESSMENT & PLAN NOTE
reasonably well controlled and no significant medication side effects noted,Continue Pravastatin 40 mg nightly,  labs reviewed Lipid borderline with triglycerides A1c was up at the time in January, CMP, CBCD,   -Discussed low fat diet, limit fast food, goodies, breads and pastas if consuming several days a week,  limit alcohol consumption as this combined with statin can cause liver problems.  -Discussed weight reduction and exercise 30 minutes 5 days a week for total of 150 minutes weekly.  -Discussed CoQ10 as directed  -Discussed if any unusual muscle aching/pain to contact the office, discussed medication and risk of muscle pain/damage from Rhabdomyolysis.

## 2023-05-03 NOTE — ASSESSMENT & PLAN NOTE
well controlled and no significant medication side effects noted A1c 6.3  %, continue glipizide XL 5 mg daily, metformin  mg TID , labs reviewed CMP, CBCD, lipids stable, worsening  BS log reviewed with patient   Monitor BS at different times: 1 day fasting, then next day 2 hours after lunch, next day 2 hours after dinner, next day at bedtime then start over and log all values. Bring log to next appointment  Discussed hypoglycemia prevention and treatment, take medication regularly don't skip doses or double up. Foot exam every day; wash and dry well between toes, look for any redness, cracks, wounds notify provider if any problems occur. Be sure to wear hard sole shoe at all times and not go barefooted. Wear proper fitting shoes to prevent ulcer, callous formation. Reminder for Podiatry visits Q 2 Months for toenail care if needed  Reminder for annual Eye exam  Reminder to keep vaccines up dated  Exercise 30 minutes daily for 150 minutes a week  Recommend Diabetic Education Classes if you have not already attended  Stay up to date on all immunizations.

## 2023-05-08 ENCOUNTER — TELEPHONE (OUTPATIENT)
Dept: CASE MANAGEMENT | Age: 73
End: 2023-05-08

## 2023-05-08 NOTE — TELEPHONE ENCOUNTER
I called the patient and left a message reminding her of the CT lung screening at [address] [place] [address] on 5/9/2023 at 5:00 pm with an 4:30 pm arrival.  If unable to keep this appt call the office at [phone] to get rescheduled.           Electronically signed by Haile Clark on 5/8/23 at 3:37 PM EDT

## 2023-05-09 ENCOUNTER — HOSPITAL ENCOUNTER (OUTPATIENT)
Dept: CT IMAGING | Age: 73
Discharge: HOME | End: 2023-05-09
Payer: MEDICARE

## 2023-05-09 DIAGNOSIS — Z87.891 PERSONAL HISTORY OF TOBACCO USE: ICD-10-CM

## 2023-05-09 PROCEDURE — 71271 CT THORAX LUNG CANCER SCR C-: CPT

## 2023-05-10 ENCOUNTER — TELEPHONE (OUTPATIENT)
Dept: CASE MANAGEMENT | Age: 73
End: 2023-05-10

## 2023-05-10 NOTE — TELEPHONE ENCOUNTER
No call, encounter opened to process CT Lung Screening. CT Lung Screen: 5/9/2023    IMPRESSION:  1. Mild emphysema. No pneumonia or acute cardiopulmonary disease. 2.  Left upper lobe solitary 3.5 mm nodule. Continued CT surveillance  recommended. 3.  Atherosclerotic calcifications including coronary artery calcifications. LUNG RADS:  Lung-RADS 2 - Benign ()     Management:  12 month screening LDCT     RECOMMENDATIONS:  If you would like to register your patient with the [facility], please contact the Nurse Navigator at  [phone]. Pack years: 27    Social History     Tobacco Use  Smoking Status: Former Smoker    Start Date:    Quit Date: 09/28/2007   Types: Cigarettes   Packs/Day: 1   Years: 27   Pack Years: 27   Smokeless Tobacco: Former         Results letter sent to patient via my chart or mailed.      [facility]

## 2023-05-23 ENCOUNTER — TELEPHONE (OUTPATIENT)
Dept: FAMILY MEDICINE CLINIC | Age: 73
End: 2023-05-23

## 2023-05-23 NOTE — TELEPHONE ENCOUNTER
----- Message from [facility] All sent at 5/23/2023  1:21 PM EDT -----  Subject: Message to Provider    QUESTIONS  Information for Provider? Pt called hoping to schedule an appt with Joceline Angulo [facility] was unable to locate this provider for pt. Please return   call to pt with any additional information.   ---------------------------------------------------------------------------  --------------  Annie Rosa INFO  [phone]; OK to leave message on voicemail  ---------------------------------------------------------------------------  --------------  SCRIPT ANSWERS  Relationship to Patient?  Self

## 2023-06-01 LAB
DIABETIC RETINOPATHY: NEGATIVE
DIABETIC RETINOPATHY: NEGATIVE

## 2023-08-03 ENCOUNTER — OFFICE VISIT (OUTPATIENT)
Dept: FAMILY MEDICINE CLINIC | Age: 73
End: 2023-08-03
Payer: MEDICARE

## 2023-08-03 VITALS
HEART RATE: 80 BPM | HEIGHT: 67 IN | DIASTOLIC BLOOD PRESSURE: 74 MMHG | BODY MASS INDEX: 26.21 KG/M2 | SYSTOLIC BLOOD PRESSURE: 122 MMHG | TEMPERATURE: 97.8 F | OXYGEN SATURATION: 98 % | WEIGHT: 167 LBS | RESPIRATION RATE: 16 BRPM

## 2023-08-03 DIAGNOSIS — I10 ESSENTIAL HYPERTENSION: ICD-10-CM

## 2023-08-03 DIAGNOSIS — K21.9 GASTROESOPHAGEAL REFLUX DISEASE WITHOUT ESOPHAGITIS: ICD-10-CM

## 2023-08-03 DIAGNOSIS — F32.5 MAJOR DEPRESSIVE DISORDER WITH SINGLE EPISODE, IN FULL REMISSION (HCC): ICD-10-CM

## 2023-08-03 DIAGNOSIS — E11.9 TYPE 2 DIABETES MELLITUS WITHOUT COMPLICATION, WITHOUT LONG-TERM CURRENT USE OF INSULIN (HCC): ICD-10-CM

## 2023-08-03 DIAGNOSIS — E11.9 TYPE 2 DIABETES MELLITUS WITHOUT COMPLICATION, WITHOUT LONG-TERM CURRENT USE OF INSULIN (HCC): Primary | ICD-10-CM

## 2023-08-03 DIAGNOSIS — E78.2 MIXED HYPERLIPIDEMIA: ICD-10-CM

## 2023-08-03 LAB
ABSOLUTE IMMATURE GRANULOCYTE: 0.03 K/UL (ref 0–0.58)
ALBUMIN SERPL-MCNC: 4.4 G/DL (ref 3.5–5.2)
ALP BLD-CCNC: 90 U/L (ref 35–104)
ALT SERPL-CCNC: 20 U/L (ref 0–32)
ANION GAP SERPL CALCULATED.3IONS-SCNC: 13 MMOL/L (ref 7–16)
AST SERPL-CCNC: 27 U/L (ref 0–31)
BASOPHILS ABSOLUTE: 0.05 K/UL (ref 0–0.2)
BASOPHILS RELATIVE PERCENT: 1 % (ref 0–2)
BILIRUB SERPL-MCNC: 0.2 MG/DL (ref 0–1.2)
BUN BLDV-MCNC: 9 MG/DL (ref 6–23)
CALCIUM SERPL-MCNC: 10 MG/DL (ref 8.6–10.2)
CHLORIDE BLD-SCNC: 102 MMOL/L (ref 98–107)
CHOLESTEROL: 160 MG/DL
CO2: 27 MMOL/L (ref 22–29)
CREAT SERPL-MCNC: 0.8 MG/DL (ref 0.5–1)
EOSINOPHILS ABSOLUTE: 0.42 K/UL (ref 0.05–0.5)
EOSINOPHILS RELATIVE PERCENT: 5 % (ref 0–6)
GFR SERPL CREATININE-BSD FRML MDRD: >60 ML/MIN/1.73M2
GLUCOSE BLD-MCNC: 128 MG/DL (ref 74–99)
HCT VFR BLD CALC: 40.8 % (ref 34–48)
HDLC SERPL-MCNC: 58 MG/DL
HEMOGLOBIN: 12.5 G/DL (ref 11.5–15.5)
IMMATURE GRANULOCYTES: 0 % (ref 0–5)
LDL CHOLESTEROL: 79 MG/DL
LYMPHOCYTES ABSOLUTE: 2.56 K/UL (ref 1.5–4)
LYMPHOCYTES RELATIVE PERCENT: 29 % (ref 20–42)
MCH RBC QN AUTO: 26.8 PG (ref 26–35)
MCHC RBC AUTO-ENTMCNC: 30.6 G/DL (ref 32–34.5)
MCV RBC AUTO: 87.4 FL (ref 80–99.9)
MONOCYTES ABSOLUTE: 0.71 K/UL (ref 0.1–0.95)
MONOCYTES RELATIVE PERCENT: 8 % (ref 2–12)
NEUTROPHILS ABSOLUTE: 5.11 K/UL (ref 1.8–7.3)
NEUTROPHILS RELATIVE PERCENT: 58 % (ref 43–80)
PDW BLD-RTO: 15 % (ref 11.5–15)
PLATELET # BLD: 203 K/UL (ref 130–450)
PMV BLD AUTO: 12.2 FL (ref 7–12)
POTASSIUM SERPL-SCNC: 4.7 MMOL/L (ref 3.5–5)
RBC # BLD: 4.67 M/UL (ref 3.5–5.5)
SODIUM BLD-SCNC: 142 MMOL/L (ref 132–146)
TOTAL PROTEIN: 8.2 G/DL (ref 6.4–8.3)
TRIGL SERPL-MCNC: 116 MG/DL
VLDLC SERPL CALC-MCNC: 23 MG/DL
WBC # BLD: 8.9 K/UL (ref 4.5–11.5)

## 2023-08-03 PROCEDURE — 2022F DILAT RTA XM EVC RTNOPTHY: CPT | Performed by: NURSE PRACTITIONER

## 2023-08-03 PROCEDURE — 99214 OFFICE O/P EST MOD 30 MIN: CPT | Performed by: NURSE PRACTITIONER

## 2023-08-03 PROCEDURE — G8399 PT W/DXA RESULTS DOCUMENT: HCPCS | Performed by: NURSE PRACTITIONER

## 2023-08-03 PROCEDURE — 3044F HG A1C LEVEL LT 7.0%: CPT | Performed by: NURSE PRACTITIONER

## 2023-08-03 PROCEDURE — 3017F COLORECTAL CA SCREEN DOC REV: CPT | Performed by: NURSE PRACTITIONER

## 2023-08-03 PROCEDURE — 1036F TOBACCO NON-USER: CPT | Performed by: NURSE PRACTITIONER

## 2023-08-03 PROCEDURE — G8417 CALC BMI ABV UP PARAM F/U: HCPCS | Performed by: NURSE PRACTITIONER

## 2023-08-03 PROCEDURE — 1123F ACP DISCUSS/DSCN MKR DOCD: CPT | Performed by: NURSE PRACTITIONER

## 2023-08-03 PROCEDURE — 3074F SYST BP LT 130 MM HG: CPT | Performed by: NURSE PRACTITIONER

## 2023-08-03 PROCEDURE — 1090F PRES/ABSN URINE INCON ASSESS: CPT | Performed by: NURSE PRACTITIONER

## 2023-08-03 PROCEDURE — G8427 DOCREV CUR MEDS BY ELIG CLIN: HCPCS | Performed by: NURSE PRACTITIONER

## 2023-08-03 PROCEDURE — 3078F DIAST BP <80 MM HG: CPT | Performed by: NURSE PRACTITIONER

## 2023-08-03 RX ORDER — BLOOD-GLUCOSE METER
1 KIT MISCELLANEOUS DAILY
Qty: 1 KIT | Refills: 0 | Status: SHIPPED | OUTPATIENT
Start: 2023-08-03

## 2023-08-03 ASSESSMENT — ENCOUNTER SYMPTOMS
VOMITING: 0
CHEST TIGHTNESS: 0
ABDOMINAL PAIN: 0
COUGH: 1
SORE THROAT: 0
BACK PAIN: 0
COLOR CHANGE: 0
SINUS PAIN: 0
SINUS PRESSURE: 0
FACIAL SWELLING: 0
TROUBLE SWALLOWING: 0
VOICE CHANGE: 0
SHORTNESS OF BREATH: 0
NAUSEA: 0
DIARRHEA: 1
CONSTIPATION: 0
WHEEZING: 0
RHINORRHEA: 0

## 2023-08-03 NOTE — ASSESSMENT & PLAN NOTE
well controlled and no significant medication side effects noted A1c 6.6 %, GFR >60continue metformin  mg TID, glipizide XL 5 mg daily. , labs reviewed CMP, CBCD, lipids stable,   BS log reviewed with patient   Monitor BS at different times: 1 day fasting, then next day 2 hours after lunch, next day 2 hours after dinner, next day at bedtime then start over and log all values. Bring log to next appointment  Discussed hypoglycemia prevention and treatment, take medication regularly don't skip doses or double up. Foot exam every day; wash and dry well between toes, look for any redness, cracks, wounds notify provider if any problems occur. Be sure to wear hard sole shoe at all times and not go barefooted. Wear proper fitting shoes to prevent ulcer, callous formation. Reminder for Podiatry visits Q 2 Months for toenail care if needed  Reminder for annual Eye exam  Reminder to keep vaccines up dated  Exercise 30 minutes daily for 150 minutes a week  Recommend Diabetic Education Classes if you have not already attended  Stay up to date on all immunizations.

## 2023-08-03 NOTE — PROGRESS NOTES
tympanic membranes, external ear and ear canal normal bilaterally, normal hearing, Nose without deformity, nasal mucosa and turbinates normal without polyps   Throat: clear, tongue midline,  drainage, no masses or lesions noted, upper denture and lower natural teeth  Neck: supple and non-tender without mass, trachea midline, no cervical lymphadenopathy, no bruit, no thyromegaly or nodules  Cardiovascular: regular rate and regular rhythm, normal S1 and S2, no murmurs, rubs, clicks, or gallop. Distal pulses intact, no carotid bruits. No edema  Pulmonary/Chest: clear to auscultation bilaterally, no wheezes, rales or rhonchi, normal air movement, no respiratory distress  Abdomen: soft, non-tender, non-distended, normal bowel sounds, no masses or hepatosplenomegaly  Musculoskeletal: Normal ROM, no joint swelling, deformity or tenderness   Neurologic:  gait, coordination and speech normal  Extremities: no clubbing, cyanosis, or edema. Psychiatric: Good eye contact, normal mood and affect, answers questions appropriately       I have reviewed my findings and recommendations with Giovanni Iyer.     Deana Arenas, APRN - CNP, NP-C, FNP-BC

## 2023-08-03 NOTE — ASSESSMENT & PLAN NOTE
well controlled, no significant side effects from medication noted. Continue amlodipine 5 mg daily, labs reviewed:CMP, CBCD, Lipids, ,   -Discussed taking medication as directed every day around the same time. Do not double up if skipped or missed doses. -Discussed exercising daily 30 minutes 5 times a week for 150 minutes weekly to help with stress reduction and weight reduction if needed.  -Discussed low sodium diet.  -Discussed limiting caffeine consumption and tobacco cessation and the effects they have on the heart and blood pressure.

## 2023-08-03 NOTE — ASSESSMENT & PLAN NOTE
well controlled and no significant medication side effects noted,continue Pravastatin 40 mg daily labs reviewed Lipid, CMP, CBCD   -Discussed low fat diet, limit fast food, goodies, breads and pastas if consuming several days a week,  limit alcohol consumption as this combined with statin can cause liver problems.  -Discussed weight reduction and exercise 30 minutes 5 days a week for total of 150 minutes weekly.  -Discussed CoQ10 as directed  -Discussed if any unusual muscle aching/pain to contact the office, discussed medication and risk of muscle pain/damage from Rhabdomyolysis.

## 2023-08-04 ENCOUNTER — NURSE ONLY (OUTPATIENT)
Dept: FAMILY MEDICINE CLINIC | Age: 73
End: 2023-08-04

## 2023-08-04 ENCOUNTER — TELEPHONE (OUTPATIENT)
Dept: FAMILY MEDICINE CLINIC | Age: 73
End: 2023-08-04

## 2023-08-04 DIAGNOSIS — E11.9 TYPE 2 DIABETES MELLITUS WITHOUT COMPLICATION, WITHOUT LONG-TERM CURRENT USE OF INSULIN (HCC): Primary | ICD-10-CM

## 2023-08-04 LAB — HBA1C MFR BLD: 6.6 %

## 2023-08-04 RX ORDER — OMEPRAZOLE 40 MG/1
40 CAPSULE, DELAYED RELEASE ORAL DAILY
Qty: 90 CAPSULE | Refills: 1 | Status: SHIPPED | OUTPATIENT
Start: 2023-08-04

## 2023-08-04 RX ORDER — CITALOPRAM 20 MG/1
20 TABLET ORAL DAILY
Qty: 90 TABLET | Refills: 1 | Status: SHIPPED | OUTPATIENT
Start: 2023-08-04

## 2023-08-04 RX ORDER — PRAVASTATIN SODIUM 40 MG
40 TABLET ORAL DAILY
Qty: 90 TABLET | Refills: 1 | Status: SHIPPED | OUTPATIENT
Start: 2023-08-04

## 2023-08-04 RX ORDER — AMLODIPINE BESYLATE 5 MG/1
5 TABLET ORAL DAILY
Qty: 90 TABLET | Refills: 1 | Status: SHIPPED | OUTPATIENT
Start: 2023-08-04

## 2023-08-04 RX ORDER — METFORMIN HYDROCHLORIDE 500 MG/1
500 TABLET, EXTENDED RELEASE ORAL
Qty: 270 TABLET | Refills: 0 | Status: SHIPPED | OUTPATIENT
Start: 2023-08-04 | End: 2024-07-29

## 2023-08-04 RX ORDER — GLIPIZIDE 5 MG/1
5 TABLET, FILM COATED, EXTENDED RELEASE ORAL DAILY
Qty: 90 TABLET | Refills: 0 | Status: SHIPPED | OUTPATIENT
Start: 2023-08-04

## 2023-08-04 NOTE — TELEPHONE ENCOUNTER
Patient returned call to the [facility]. I noted msg from Erica Christianson, [address] [facility] who [facility] requesting a call back this morning. MA was not available at the time.   I informed patient, as noted by Ravi Bartholomew on 8/3/23, lab results are normal.

## 2023-11-22 DIAGNOSIS — E11.9 TYPE 2 DIABETES MELLITUS WITHOUT COMPLICATION, WITHOUT LONG-TERM CURRENT USE OF INSULIN (HCC): ICD-10-CM

## 2023-11-22 RX ORDER — METFORMIN HYDROCHLORIDE 500 MG/1
TABLET, EXTENDED RELEASE ORAL
Qty: 270 TABLET | Refills: 0 | Status: SHIPPED | OUTPATIENT
Start: 2023-11-22

## 2023-11-22 RX ORDER — GLIPIZIDE 5 MG/1
5 TABLET, FILM COATED, EXTENDED RELEASE ORAL DAILY
Qty: 90 TABLET | Refills: 0 | Status: SHIPPED | OUTPATIENT
Start: 2023-11-22

## 2024-02-07 ENCOUNTER — OFFICE VISIT (OUTPATIENT)
Dept: FAMILY MEDICINE CLINIC | Age: 74
End: 2024-02-07
Payer: MEDICARE

## 2024-02-07 VITALS
TEMPERATURE: 97.6 F | RESPIRATION RATE: 16 BRPM | HEART RATE: 87 BPM | HEIGHT: 67 IN | WEIGHT: 168 LBS | SYSTOLIC BLOOD PRESSURE: 142 MMHG | BODY MASS INDEX: 26.37 KG/M2 | OXYGEN SATURATION: 100 % | DIASTOLIC BLOOD PRESSURE: 72 MMHG

## 2024-02-07 DIAGNOSIS — E11.9 TYPE 2 DIABETES MELLITUS WITHOUT COMPLICATION, WITHOUT LONG-TERM CURRENT USE OF INSULIN (HCC): Primary | ICD-10-CM

## 2024-02-07 DIAGNOSIS — I10 ESSENTIAL HYPERTENSION: ICD-10-CM

## 2024-02-07 DIAGNOSIS — J43.9 MILD EMPHYSEMA (HCC): ICD-10-CM

## 2024-02-07 DIAGNOSIS — E11.69 ONYCHOMYCOSIS OF MULTIPLE TOENAILS WITH TYPE 2 DIABETES MELLITUS (HCC): ICD-10-CM

## 2024-02-07 DIAGNOSIS — B35.1 ONYCHOMYCOSIS OF MULTIPLE TOENAILS WITH TYPE 2 DIABETES MELLITUS (HCC): ICD-10-CM

## 2024-02-07 DIAGNOSIS — R91.1 LUNG NODULE SEEN ON IMAGING STUDY: ICD-10-CM

## 2024-02-07 DIAGNOSIS — I25.10 CORONARY ARTERY CALCIFICATION SEEN ON CAT SCAN: ICD-10-CM

## 2024-02-07 DIAGNOSIS — F32.5 MAJOR DEPRESSIVE DISORDER WITH SINGLE EPISODE, IN FULL REMISSION (HCC): ICD-10-CM

## 2024-02-07 DIAGNOSIS — K21.9 GASTROESOPHAGEAL REFLUX DISEASE WITHOUT ESOPHAGITIS: ICD-10-CM

## 2024-02-07 DIAGNOSIS — E78.2 MIXED HYPERLIPIDEMIA: ICD-10-CM

## 2024-02-07 DIAGNOSIS — R06.09 DOE (DYSPNEA ON EXERTION): ICD-10-CM

## 2024-02-07 PROBLEM — F32.A DEPRESSION: Status: RESOLVED | Noted: 2021-09-27 | Resolved: 2024-02-07

## 2024-02-07 LAB
CHP ED QC CHECK: ABNORMAL
CREATININE URINE POCT: 50
GLUCOSE BLD-MCNC: 124 MG/DL
HBA1C MFR BLD: 6.4 %
MICROALBUMIN/CREAT 24H UR: 30 MG/G{CREAT}
MICROALBUMIN/CREAT UR-RTO: ABNORMAL

## 2024-02-07 PROCEDURE — 82044 UR ALBUMIN SEMIQUANTITATIVE: CPT | Performed by: NURSE PRACTITIONER

## 2024-02-07 PROCEDURE — 3077F SYST BP >= 140 MM HG: CPT | Performed by: NURSE PRACTITIONER

## 2024-02-07 PROCEDURE — G8484 FLU IMMUNIZE NO ADMIN: HCPCS | Performed by: NURSE PRACTITIONER

## 2024-02-07 PROCEDURE — 3078F DIAST BP <80 MM HG: CPT | Performed by: NURSE PRACTITIONER

## 2024-02-07 PROCEDURE — 93000 ELECTROCARDIOGRAM COMPLETE: CPT | Performed by: NURSE PRACTITIONER

## 2024-02-07 PROCEDURE — G8399 PT W/DXA RESULTS DOCUMENT: HCPCS | Performed by: NURSE PRACTITIONER

## 2024-02-07 PROCEDURE — 3044F HG A1C LEVEL LT 7.0%: CPT | Performed by: NURSE PRACTITIONER

## 2024-02-07 PROCEDURE — 99214 OFFICE O/P EST MOD 30 MIN: CPT | Performed by: NURSE PRACTITIONER

## 2024-02-07 PROCEDURE — 1036F TOBACCO NON-USER: CPT | Performed by: NURSE PRACTITIONER

## 2024-02-07 PROCEDURE — 3017F COLORECTAL CA SCREEN DOC REV: CPT | Performed by: NURSE PRACTITIONER

## 2024-02-07 PROCEDURE — 1123F ACP DISCUSS/DSCN MKR DOCD: CPT | Performed by: NURSE PRACTITIONER

## 2024-02-07 PROCEDURE — G8417 CALC BMI ABV UP PARAM F/U: HCPCS | Performed by: NURSE PRACTITIONER

## 2024-02-07 PROCEDURE — 82962 GLUCOSE BLOOD TEST: CPT | Performed by: NURSE PRACTITIONER

## 2024-02-07 PROCEDURE — 83036 HEMOGLOBIN GLYCOSYLATED A1C: CPT | Performed by: NURSE PRACTITIONER

## 2024-02-07 PROCEDURE — 3023F SPIROM DOC REV: CPT | Performed by: NURSE PRACTITIONER

## 2024-02-07 PROCEDURE — 2022F DILAT RTA XM EVC RTNOPTHY: CPT | Performed by: NURSE PRACTITIONER

## 2024-02-07 PROCEDURE — G8427 DOCREV CUR MEDS BY ELIG CLIN: HCPCS | Performed by: NURSE PRACTITIONER

## 2024-02-07 PROCEDURE — 1090F PRES/ABSN URINE INCON ASSESS: CPT | Performed by: NURSE PRACTITIONER

## 2024-02-07 RX ORDER — AMLODIPINE BESYLATE 5 MG/1
5 TABLET ORAL DAILY
Qty: 90 TABLET | Refills: 1 | Status: SHIPPED | OUTPATIENT
Start: 2024-02-07

## 2024-02-07 RX ORDER — OMEPRAZOLE 40 MG/1
40 CAPSULE, DELAYED RELEASE ORAL DAILY
Qty: 90 CAPSULE | Refills: 1 | Status: SHIPPED | OUTPATIENT
Start: 2024-02-07

## 2024-02-07 RX ORDER — GLIPIZIDE 5 MG/1
5 TABLET, FILM COATED, EXTENDED RELEASE ORAL DAILY
Qty: 90 TABLET | Refills: 1 | Status: SHIPPED | OUTPATIENT
Start: 2024-02-07

## 2024-02-07 RX ORDER — METFORMIN HYDROCHLORIDE 500 MG/1
TABLET, EXTENDED RELEASE ORAL
Qty: 270 TABLET | Refills: 1 | Status: SHIPPED | OUTPATIENT
Start: 2024-02-07

## 2024-02-07 RX ORDER — PRAVASTATIN SODIUM 40 MG
40 TABLET ORAL DAILY
Qty: 90 TABLET | Refills: 1 | Status: SHIPPED | OUTPATIENT
Start: 2024-02-07

## 2024-02-07 RX ORDER — AMLODIPINE BESYLATE 5 MG/1
5 TABLET ORAL DAILY
Qty: 30 TABLET | Refills: 0 | Status: SHIPPED
Start: 2024-02-07 | End: 2024-02-07 | Stop reason: SDUPTHER

## 2024-02-07 RX ORDER — CITALOPRAM 20 MG/1
20 TABLET ORAL DAILY
Qty: 90 TABLET | Refills: 1 | Status: SHIPPED | OUTPATIENT
Start: 2024-02-07

## 2024-02-07 ASSESSMENT — ENCOUNTER SYMPTOMS
SHORTNESS OF BREATH: 1
VOICE CHANGE: 0
SINUS PAIN: 0
NAUSEA: 0
VOMITING: 0
SINUS PRESSURE: 0
CHEST TIGHTNESS: 0
ABDOMINAL PAIN: 0
SORE THROAT: 0
WHEEZING: 0
RHINORRHEA: 0
BACK PAIN: 0
TROUBLE SWALLOWING: 0
CONSTIPATION: 0
FACIAL SWELLING: 0
DIARRHEA: 0
COLOR CHANGE: 0
COUGH: 0

## 2024-02-07 ASSESSMENT — PATIENT HEALTH QUESTIONNAIRE - PHQ9
1. LITTLE INTEREST OR PLEASURE IN DOING THINGS: 0
3. TROUBLE FALLING OR STAYING ASLEEP: 0
SUM OF ALL RESPONSES TO PHQ QUESTIONS 1-9: 0
2. FEELING DOWN, DEPRESSED OR HOPELESS: 0
SUM OF ALL RESPONSES TO PHQ QUESTIONS 1-9: 0
10. IF YOU CHECKED OFF ANY PROBLEMS, HOW DIFFICULT HAVE THESE PROBLEMS MADE IT FOR YOU TO DO YOUR WORK, TAKE CARE OF THINGS AT HOME, OR GET ALONG WITH OTHER PEOPLE: 0
7. TROUBLE CONCENTRATING ON THINGS, SUCH AS READING THE NEWSPAPER OR WATCHING TELEVISION: 0
8. MOVING OR SPEAKING SO SLOWLY THAT OTHER PEOPLE COULD HAVE NOTICED. OR THE OPPOSITE, BEING SO FIGETY OR RESTLESS THAT YOU HAVE BEEN MOVING AROUND A LOT MORE THAN USUAL: 0
SUM OF ALL RESPONSES TO PHQ QUESTIONS 1-9: 0
SUM OF ALL RESPONSES TO PHQ QUESTIONS 1-9: 0
4. FEELING TIRED OR HAVING LITTLE ENERGY: 0
SUM OF ALL RESPONSES TO PHQ9 QUESTIONS 1 & 2: 0
6. FEELING BAD ABOUT YOURSELF - OR THAT YOU ARE A FAILURE OR HAVE LET YOURSELF OR YOUR FAMILY DOWN: 0
5. POOR APPETITE OR OVEREATING: 0
9. THOUGHTS THAT YOU WOULD BE BETTER OFF DEAD, OR OF HURTING YOURSELF: 0

## 2024-02-07 NOTE — ASSESSMENT & PLAN NOTE
Stable at this time. She declines any inhalers and also declines all vaccinations.  She does agree to see DR Schwarz and will be due for CT lung screening in May due to nodule

## 2024-02-07 NOTE — ASSESSMENT & PLAN NOTE
well controlled and no significant medication side effects noted A1c 6.4 %, GFR >60 continue metformin  mg TID, glipizide XL 5 mg daily., labs reviewed CMP, CBCD, lipids stable in epic 2023, fasting labs ordered for tomorrow,   BS log reviewed with patient   Monitor BS at different times: 1 day fasting, then next day 2 hours after lunch, next day 2 hours after dinner, next day at bedtime then start over and log all values.  Bring log to next appointment  Discussed hypoglycemia prevention and treatment, take medication regularly don't skip doses or double up.   Foot exam every day; wash and dry well between toes, look for any redness, cracks, wounds notify provider if any problems occur. Be sure to wear hard sole shoe at all times and not go barefooted. Wear proper fitting shoes to prevent ulcer, callous formation.   Reminder for Podiatry visits Q 2 Months for toenail care if needed  Reminder for annual Eye exam  Reminder to keep vaccines up dated  Exercise 30 minutes daily for 150 minutes a week  Recommend Diabetic Education Classes if you have not already attended  Stay up to date on all immunizations.

## 2024-02-07 NOTE — ASSESSMENT & PLAN NOTE
Stable referral to DR Barrera as she has seen him in the past and coronary artery calcifications noted. Discussed she may also have sleep apnea will address next visit.   EKG SR with negative precordial T waves asymptomatic

## 2024-02-07 NOTE — ASSESSMENT & PLAN NOTE
well controlled and no significant medication side effects noted,continue Pravastatin 40 mg daily labs reviewed Lipid, CMP, CBCD   -Discussed low fat diet, limit fast food, goodies, breads and pastas if consuming several days a week,  limit alcohol consumption as this combined with statin can cause liver problems.  -Discussed weight reduction and exercise 30 minutes 5 days a week for total of 150 minutes weekly.  -Discussed CoQ10 as directed  -Discussed if any unusual muscle aching/pain to contact the office, discussed medication and risk of muscle pain/damage from Rhabdomyolysis

## 2024-02-07 NOTE — ASSESSMENT & PLAN NOTE
New She declines any inhalers and also declines all vaccinations.  She does agree to see DR Schwarz and will be due for CT lung screening in May due to nodule

## 2024-02-07 NOTE — PROGRESS NOTES
diet.  Blood pressure is somewhat controlled at home. Cardiac symptoms dyspnea and fatigue. Patient denies chest pain, chest pressure/discomfort, claudication, exertional chest pressure/discomfort, irregular heart beat, lower extremity edema, near-syncope, orthopnea, palpitations, paroxysmal nocturnal dyspnea, syncope, and tachypnea.  Cardiovascular risk factors: advanced age (older than 55 for men, 65 for women), diabetes mellitus, dyslipidemia, hypertension, and smoking/ tobacco exposure. Use of agents associated with hypertension: none. History of target organ damage: none.     Hyperlipidemia: Patient presents with hyperlipidemia.  She was tested because DM, HTN, HLD.  Her last labs 8/3/23 on Pravastatin 40 mg daily showed Total cholesterol of 160, HDL 58, LDL 79,  Triglycerides 116. Denies chest pain, exertional chest pressure/discomfort, feeding intolerance, lower extremity edema, palpitations, poor exercise tolerance, syncope, tachypnea, and skin xanthelasma. There is not a family history of hyperlipidemia. There is not a family history of early ischemia heart disease.   Lab Results   Component Value Date    CHOL 160 08/03/2023    CHOL 165 01/04/2023    CHOL 176 03/21/2022     Lab Results   Component Value Date    TRIG 116 08/03/2023    TRIG 193 (H) 01/04/2023    TRIG 158 (H) 03/21/2022     Lab Results   Component Value Date    HDL 58 08/03/2023    HDL 58 01/04/2023    HDL 58 03/21/2022     Lab Results   Component Value Date    LDLCHOLESTEROL 79 08/03/2023    LDLCALC 68 01/04/2023    LDLCALC 86 03/21/2022    LDLCALC 77 09/21/2021     Lab Results   Component Value Date    VLDL 23 08/03/2023         Depression has been stable on Citalopram 20 mg daily. Feels down only when I think of my boys. Denies any adverse effects.     She is complaining of ANDERSON with climbing the stairs and taking the laundry up the stairs. She says she has always had it and this was why she quit smoking.She saw DR Barrera several years ago

## 2024-02-07 NOTE — ASSESSMENT & PLAN NOTE
well controlled, no significant side effects from medication noted. Continue amlodipine 5 mg daily, labs reviewed:CMP, CBCD, Lipids from 2023, fasting labs ordered today, ,   -Discussed taking medication as directed every day around the same time. Do not double up if skipped or missed doses.   -Discussed exercising daily 30 minutes 5 times a week for 150 minutes weekly to help with stress reduction and weight reduction if needed.  -Discussed low sodium diet.  -Discussed limiting caffeine consumption and tobacco cessation and the effects they have on the heart and blood pressure

## 2024-02-08 ENCOUNTER — HOSPITAL ENCOUNTER (OUTPATIENT)
Age: 74
Discharge: HOME | End: 2024-02-08
Payer: MEDICARE

## 2024-02-08 DIAGNOSIS — E11.9 TYPE 2 DIABETES MELLITUS WITHOUT COMPLICATION, WITHOUT LONG-TERM CURRENT USE OF INSULIN (HCC): ICD-10-CM

## 2024-02-08 DIAGNOSIS — I10 ESSENTIAL HYPERTENSION: ICD-10-CM

## 2024-02-08 DIAGNOSIS — E78.2 MIXED HYPERLIPIDEMIA: ICD-10-CM

## 2024-02-08 LAB
ALBUMIN SERPL-MCNC: 4.2 G/DL (ref 3.5–5.2)
ALP SERPL-CCNC: 81 U/L (ref 35–104)
ALT SERPL-CCNC: 29 U/L (ref 0–32)
ANION GAP SERPL CALCULATED.3IONS-SCNC: 10 MMOL/L (ref 7–16)
AST SERPL-CCNC: 31 U/L (ref 0–31)
BASOPHILS # BLD: 0.04 K/UL (ref 0–0.2)
BASOPHILS NFR BLD: 1 % (ref 0–2)
BILIRUB SERPL-MCNC: 0.4 MG/DL (ref 0–1.2)
BUN SERPL-MCNC: 12 MG/DL (ref 6–23)
CALCIUM SERPL-MCNC: 9.6 MG/DL (ref 8.6–10.2)
CHLORIDE SERPL-SCNC: 97 MMOL/L (ref 98–107)
CHOLEST SERPL-MCNC: 170 MG/DL
CO2 SERPL-SCNC: 28 MMOL/L (ref 22–29)
CREAT SERPL-MCNC: 0.8 MG/DL (ref 0.5–1)
EOSINOPHIL # BLD: 0.34 K/UL (ref 0.05–0.5)
EOSINOPHILS RELATIVE PERCENT: 5 % (ref 0–6)
ERYTHROCYTE [DISTWIDTH] IN BLOOD BY AUTOMATED COUNT: 14.9 % (ref 12–16)
GFR SERPL CREATININE-BSD FRML MDRD: >60 ML/MIN/1.73M2
GLUCOSE SERPL-MCNC: 109 MG/DL (ref 74–99)
HCT VFR BLD AUTO: 38.1 % (ref 34–48)
HDLC SERPL-MCNC: 51 MG/DL
HGB BLD-MCNC: 12 G/DL (ref 11.5–15.5)
IMM GRANULOCYTES # BLD AUTO: <0.03 K/UL (ref 0–0.58)
IMM GRANULOCYTES NFR BLD: 0 % (ref 0–5)
LDLC SERPL CALC-MCNC: 80 MG/DL
LYMPHOCYTES NFR BLD: 2.1 K/UL (ref 1.5–4)
LYMPHOCYTES RELATIVE PERCENT: 30 % (ref 20–42)
MCH RBC QN AUTO: 26.1 PG (ref 26–35)
MCHC RBC AUTO-ENTMCNC: 31.5 G/DL (ref 32–34.5)
MCV RBC AUTO: 83 FL (ref 80–99.9)
MONOCYTES NFR BLD: 0.58 K/UL (ref 0.1–0.95)
MONOCYTES NFR BLD: 8 % (ref 2–12)
NEUTROPHILS NFR BLD: 57 % (ref 43–80)
NEUTS SEG NFR BLD: 4.06 K/UL (ref 1.8–7.3)
PLATELET # BLD AUTO: 186 K/UL (ref 130–450)
PMV BLD AUTO: 11.6 FL (ref 7–12)
POTASSIUM SERPL-SCNC: 4.6 MMOL/L (ref 3.5–5)
PROT SERPL-MCNC: 7.7 G/DL (ref 6.4–8.3)
RBC # BLD AUTO: 4.59 M/UL (ref 3.5–5.5)
SODIUM SERPL-SCNC: 135 MMOL/L (ref 132–146)
TRIGL SERPL-MCNC: 197 MG/DL
VLDLC SERPL CALC-MCNC: 39 MG/DL
WBC OTHER # BLD: 7.1 K/UL (ref 4.5–11.5)

## 2024-02-08 PROCEDURE — 80053 COMPREHEN METABOLIC PANEL: CPT

## 2024-02-08 PROCEDURE — 85025 COMPLETE CBC W/AUTO DIFF WBC: CPT

## 2024-02-08 PROCEDURE — 36415 COLL VENOUS BLD VENIPUNCTURE: CPT

## 2024-02-08 PROCEDURE — 80061 LIPID PANEL: CPT

## 2024-02-08 NOTE — RESULT ENCOUNTER NOTE
Please notify patient that their lab results are stable, triglycerides are elevated 197 so watch diet.

## 2024-03-11 ENCOUNTER — OFFICE VISIT (OUTPATIENT)
Dept: PULMONOLOGY | Age: 74
End: 2024-03-11
Payer: MEDICARE

## 2024-03-11 VITALS
BODY MASS INDEX: 26.37 KG/M2 | DIASTOLIC BLOOD PRESSURE: 101 MMHG | OXYGEN SATURATION: 96 % | SYSTOLIC BLOOD PRESSURE: 153 MMHG | HEART RATE: 98 BPM | HEIGHT: 67 IN | WEIGHT: 168 LBS | TEMPERATURE: 98.4 F

## 2024-03-11 DIAGNOSIS — J44.9 CHRONIC OBSTRUCTIVE PULMONARY DISEASE, UNSPECIFIED COPD TYPE (HCC): Primary | ICD-10-CM

## 2024-03-11 DIAGNOSIS — R91.1 LUNG NODULE: ICD-10-CM

## 2024-03-11 PROCEDURE — G8427 DOCREV CUR MEDS BY ELIG CLIN: HCPCS | Performed by: INTERNAL MEDICINE

## 2024-03-11 PROCEDURE — 99204 OFFICE O/P NEW MOD 45 MIN: CPT | Performed by: INTERNAL MEDICINE

## 2024-03-11 PROCEDURE — 1123F ACP DISCUSS/DSCN MKR DOCD: CPT | Performed by: INTERNAL MEDICINE

## 2024-03-11 PROCEDURE — 3077F SYST BP >= 140 MM HG: CPT | Performed by: INTERNAL MEDICINE

## 2024-03-11 PROCEDURE — 3023F SPIROM DOC REV: CPT | Performed by: INTERNAL MEDICINE

## 2024-03-11 PROCEDURE — 1090F PRES/ABSN URINE INCON ASSESS: CPT | Performed by: INTERNAL MEDICINE

## 2024-03-11 PROCEDURE — 3017F COLORECTAL CA SCREEN DOC REV: CPT | Performed by: INTERNAL MEDICINE

## 2024-03-11 PROCEDURE — G8484 FLU IMMUNIZE NO ADMIN: HCPCS | Performed by: INTERNAL MEDICINE

## 2024-03-11 PROCEDURE — G8417 CALC BMI ABV UP PARAM F/U: HCPCS | Performed by: INTERNAL MEDICINE

## 2024-03-11 PROCEDURE — 1036F TOBACCO NON-USER: CPT | Performed by: INTERNAL MEDICINE

## 2024-03-11 PROCEDURE — 3080F DIAST BP >= 90 MM HG: CPT | Performed by: INTERNAL MEDICINE

## 2024-03-11 PROCEDURE — G8399 PT W/DXA RESULTS DOCUMENT: HCPCS | Performed by: INTERNAL MEDICINE

## 2024-03-11 ASSESSMENT — ENCOUNTER SYMPTOMS
GASTROINTESTINAL NEGATIVE: 1
COUGH: 1
EYES NEGATIVE: 1
SHORTNESS OF BREATH: 1
ALLERGIC/IMMUNOLOGIC NEGATIVE: 1

## 2024-03-11 NOTE — PROGRESS NOTES
Patient to follow up with physician in 2 months. Orders for PFT/6 min walk and Chest CT will be all scheduled in May per patient, @ [facility].   
Palpations: Abdomen is soft.   Musculoskeletal:         General: Normal range of motion.      Cervical back: Normal range of motion and neck supple.   Skin:     General: Skin is warm.   Neurological:      General: No focal deficit present.      Mental Status: She is alert and oriented to person, place, and time.          Assessment/Plan:   Diagnosis Orders   1. Chronic obstructive pulmonary disease, unspecified COPD type (HCC)  Full PFT Study With Bronchodilator    6 Minute Walk Test      2. Lung nodule  CT CHEST WO CONTRAST        PFT, 6 minute walk test and chest CT ordered.    Follow up in 2 months.     Electronically by Molina Vasquez MD  on 3/11/24 at 11:14 AM EDT

## 2024-04-08 ENCOUNTER — OFFICE VISIT (OUTPATIENT)
Dept: FAMILY MEDICINE CLINIC | Age: 74
End: 2024-04-08
Payer: MEDICARE

## 2024-04-08 VITALS
TEMPERATURE: 97.9 F | RESPIRATION RATE: 16 BRPM | BODY MASS INDEX: 26.21 KG/M2 | OXYGEN SATURATION: 97 % | HEIGHT: 67 IN | DIASTOLIC BLOOD PRESSURE: 80 MMHG | HEART RATE: 89 BPM | WEIGHT: 167 LBS | SYSTOLIC BLOOD PRESSURE: 122 MMHG

## 2024-04-08 DIAGNOSIS — Z13.6 SCREENING FOR CARDIOVASCULAR CONDITION: ICD-10-CM

## 2024-04-08 DIAGNOSIS — I10 ESSENTIAL HYPERTENSION: ICD-10-CM

## 2024-04-08 DIAGNOSIS — Z28.21 VACCINATION DECLINED BY PATIENT: ICD-10-CM

## 2024-04-08 DIAGNOSIS — Z12.11 ENCOUNTER FOR SCREENING COLONOSCOPY: ICD-10-CM

## 2024-04-08 DIAGNOSIS — Z00.00 MEDICARE ANNUAL WELLNESS VISIT, SUBSEQUENT: Primary | ICD-10-CM

## 2024-04-08 PROCEDURE — 3074F SYST BP LT 130 MM HG: CPT | Performed by: NURSE PRACTITIONER

## 2024-04-08 PROCEDURE — 1123F ACP DISCUSS/DSCN MKR DOCD: CPT | Performed by: NURSE PRACTITIONER

## 2024-04-08 PROCEDURE — 3017F COLORECTAL CA SCREEN DOC REV: CPT | Performed by: NURSE PRACTITIONER

## 2024-04-08 PROCEDURE — 3079F DIAST BP 80-89 MM HG: CPT | Performed by: NURSE PRACTITIONER

## 2024-04-08 PROCEDURE — G0439 PPPS, SUBSEQ VISIT: HCPCS | Performed by: NURSE PRACTITIONER

## 2024-04-08 RX ORDER — DILTIAZEM HYDROCHLORIDE 180 MG/1
180 CAPSULE, COATED, EXTENDED RELEASE ORAL DAILY
Qty: 90 CAPSULE | Refills: 1 | Status: SHIPPED | OUTPATIENT
Start: 2024-04-08

## 2024-04-08 SDOH — ECONOMIC STABILITY: INCOME INSECURITY: HOW HARD IS IT FOR YOU TO PAY FOR THE VERY BASICS LIKE FOOD, HOUSING, MEDICAL CARE, AND HEATING?: NOT HARD AT ALL

## 2024-04-08 SDOH — ECONOMIC STABILITY: FOOD INSECURITY: WITHIN THE PAST 12 MONTHS, YOU WORRIED THAT YOUR FOOD WOULD RUN OUT BEFORE YOU GOT MONEY TO BUY MORE.: NEVER TRUE

## 2024-04-08 SDOH — ECONOMIC STABILITY: FOOD INSECURITY: WITHIN THE PAST 12 MONTHS, THE FOOD YOU BOUGHT JUST DIDN'T LAST AND YOU DIDN'T HAVE MONEY TO GET MORE.: NEVER TRUE

## 2024-04-08 ASSESSMENT — PATIENT HEALTH QUESTIONNAIRE - PHQ9
SUM OF ALL RESPONSES TO PHQ QUESTIONS 1-9: 1
4. FEELING TIRED OR HAVING LITTLE ENERGY: NOT AT ALL
SUM OF ALL RESPONSES TO PHQ QUESTIONS 1-9: 1
7. TROUBLE CONCENTRATING ON THINGS, SUCH AS READING THE NEWSPAPER OR WATCHING TELEVISION: NOT AT ALL
8. MOVING OR SPEAKING SO SLOWLY THAT OTHER PEOPLE COULD HAVE NOTICED. OR THE OPPOSITE, BEING SO FIGETY OR RESTLESS THAT YOU HAVE BEEN MOVING AROUND A LOT MORE THAN USUAL: NOT AT ALL
5. POOR APPETITE OR OVEREATING: NOT AT ALL
2. FEELING DOWN, DEPRESSED OR HOPELESS: SEVERAL DAYS
9. THOUGHTS THAT YOU WOULD BE BETTER OFF DEAD, OR OF HURTING YOURSELF: NOT AT ALL
3. TROUBLE FALLING OR STAYING ASLEEP: NOT AT ALL
SUM OF ALL RESPONSES TO PHQ QUESTIONS 1-9: 1
1. LITTLE INTEREST OR PLEASURE IN DOING THINGS: NOT AT ALL
6. FEELING BAD ABOUT YOURSELF - OR THAT YOU ARE A FAILURE OR HAVE LET YOURSELF OR YOUR FAMILY DOWN: NOT AT ALL
SUM OF ALL RESPONSES TO PHQ QUESTIONS 1-9: 1
10. IF YOU CHECKED OFF ANY PROBLEMS, HOW DIFFICULT HAVE THESE PROBLEMS MADE IT FOR YOU TO DO YOUR WORK, TAKE CARE OF THINGS AT HOME, OR GET ALONG WITH OTHER PEOPLE: NOT DIFFICULT AT ALL
SUM OF ALL RESPONSES TO PHQ9 QUESTIONS 1 & 2: 1

## 2024-04-08 NOTE — PATIENT INSTRUCTIONS
Take your medicines exactly as prescribed. Call your doctor if you think you are having a problem with your medicine.     If your doctor recommends aspirin, take the amount directed each day. Make sure you take aspirin and not another kind of pain reliever, such as acetaminophen (Tylenol).   When should you call for help?   Call 911 if you have symptoms of a heart attack. These may include:    Chest pain or pressure, or a strange feeling in the chest.     Sweating.     Shortness of breath.     Pain, pressure, or a strange feeling in the back, neck, jaw, or upper belly or in one or both shoulders or arms.     Lightheadedness or sudden weakness.     A fast or irregular heartbeat.   After you call 911, the  may tell you to chew 1 adult-strength or 2 to 4 low-dose aspirin. Wait for an ambulance. Do not try to drive yourself.  Watch closely for changes in your health, and be sure to contact your doctor if you have any problems.  Where can you learn more?  Go to https://www.Factor[employer].net/patientEd and enter F075 to learn more about \"A Healthy Heart: Care Instructions.\"  Current as of: June 24, 2023               Content Version: 14.0  © 8616-3741 [employer].   Care instructions adapted under license by [employer]. If you have questions about a medical condition or this instruction, always ask your healthcare professional. [employer] disclaims any warranty or liability for your use of this information.      Personalized Preventive Plan for Cornelia Kemp - 4/8/2024  Medicare offers a range of preventive health benefits. Some of the tests and screenings are paid in full while other may be subject to a deductible, co-insurance, and/or copay.    Some of these benefits include a comprehensive review of your medical history including lifestyle, illnesses that may run in your family, and various assessments and screenings as appropriate.    After reviewing your medical record and screening

## 2024-04-08 NOTE — PROGRESS NOTES
normal  ENT: tympanic membrane, external ear and ear canal normal bilaterally, nose without deformity, nasal mucosa and turbinates normal without polyps  Neck: supple and non-tender without mass, no thyromegaly or thyroid nodules, no cervical lymphadenopathy  Pulmonary/Chest: clear to auscultation bilaterally- no wheezes, rales or rhonchi, normal air movement, no respiratory distress  Cardiovascular: normal rate, regular rhythm, normal S1 and S2, no murmurs, rubs, clicks, or gallops, distal pulses intact, no carotid bruits  Abdomen: soft, non-tender, non-distended, normal bowel sounds, no masses or organomegaly  Breast: appear normal, no suspicious masses, no skin or nipple changes or axillary nodes  Extremities: no cyanosis, clubbing or edema  Musculoskeletal: normal range of motion, no joint swelling, deformity or tenderness  Neurologic: reflexes normal and symmetric, no cranial nerve deficit, gait, coordination and speech normal       Allergies   Allergen Reactions    Ace Inhibitors      Cough        Prior to Visit Medications    Medication Sig Taking? Authorizing Provider   dilTIAZem (CARDIZEM CD) 180 MG extended release capsule Take 1 capsule by mouth daily Yes Karime Alejandra APRN - CNP   citalopram (CELEXA) 20 MG tablet Take 1 tablet by mouth daily Yes Karime Alejandra APRN - CNP   glipiZIDE (GLUCOTROL XL) 5 MG extended release tablet Take 1 tablet by mouth daily Yes Karime Alejandra APRN - CNP   metFORMIN (GLUCOPHAGE-XR) 500 MG extended release tablet TAKE 1 TABLET 3 TIMES A DAYWITH MEALS Yes Karime Alejandra APRN - CNP   omeprazole (PRILOSEC) 40 MG delayed release capsule Take 1 capsule by mouth daily Yes Karime Alejandra APRN - CNP   pravastatin (PRAVACHOL) 40 MG tablet Take 1 tablet by mouth daily Yes Karime Alejandra APRN - CNP   glucose monitoring (FREESTYLE FREEDOM) kit 1 kit by Does not apply route daily Please provide what the insurance will cover Yes Karime Alejandra APRN - CNP   blood glucose monitor  45

## 2024-04-08 NOTE — ASSESSMENT & PLAN NOTE
SDM requires that the patient: Patient involved as active partner with the clinician in clarifying acceptable options and choosing a preferred one discussed FIT, Cologuard and Colonoscopy. If choosing FIT or Cologuard and if positive results patient understands the need to proceed with colonoscopy. Recommendations are individualized based on patients' special needs and preferences.    Understands the risk/seriousness of the disease or condition. Understands the preventive services, including risks, benefits, alternatives. Weighs their values regarding the potential harms and benefits of each option. Engages in decision-making at a level they desire and feel comfortable with.  Referral to DR Angelo

## 2024-04-25 ENCOUNTER — HOSPITAL ENCOUNTER (OUTPATIENT)
Dept: CT IMAGING | Age: 74
Discharge: HOME | End: 2024-04-25
Attending: INTERNAL MEDICINE
Payer: MEDICARE

## 2024-04-25 ENCOUNTER — HOSPITAL ENCOUNTER (OUTPATIENT)
Dept: PULMONOLOGY | Age: 74
Discharge: HOME | End: 2024-04-25
Attending: INTERNAL MEDICINE
Payer: MEDICARE

## 2024-04-25 VITALS — WEIGHT: 167 LBS | BODY MASS INDEX: 26.21 KG/M2 | HEIGHT: 67 IN

## 2024-04-25 DIAGNOSIS — J44.9 CHRONIC OBSTRUCTIVE PULMONARY DISEASE, UNSPECIFIED COPD TYPE (HCC): ICD-10-CM

## 2024-04-25 DIAGNOSIS — R91.1 LUNG NODULE: ICD-10-CM

## 2024-04-25 PROCEDURE — 94726 PLETHYSMOGRAPHY LUNG VOLUMES: CPT

## 2024-04-25 PROCEDURE — 94060 EVALUATION OF WHEEZING: CPT

## 2024-04-25 PROCEDURE — 94618 PULMONARY STRESS TESTING: CPT

## 2024-04-25 PROCEDURE — 94729 DIFFUSING CAPACITY: CPT

## 2024-04-25 PROCEDURE — 71250 CT THORAX DX C-: CPT

## 2024-04-25 ASSESSMENT — 6 MINUTE WALK TEST (6MWT)
BORG DYSPNEA SCALE SCORE: NOTHING AT ALL
DID PATIENT STOP OR PAUSE BEFORE 6 MINUTES?: NO
HEART RATE: 52
HEART RATE: 62
HEART RATE: 56
O2 SATURATION: 94
BORG DYSPNEA SCALE SCORE: NOTHING AT ALL
O2 SATURATION 5: 91
OXYGEN DEVICE: ROOM AIR
O2 SATURATION: 95
ADDTIONAL O2 FLOW RATE (L/MIN): YES
BORG DYSPNEA SCALE SCORE: NOTHING AT ALL
O2 SATURATION 4: 91
BLOOD PRESSURE 1: 162/98
O2 SATURATION 6: 91
O2 SATURATION: 92
O2 SATURATION 2: 93
O2 SATURATION 3: 91
TOTAL DISTANCE WALKED (M): 360
BLOOD PRESSURE: 165/99
% PREDICTED: 82.57

## 2024-04-25 NOTE — PROGRESS NOTES
04/25/24 0944   Data Measured Before Walk   Height 1.702 m (5' 7\")   Weight - Scale 75.8 kg (167 lb)   HR 52   Blood Pressure (!) 162/98   O2 Saturation 95   O2 Device Room air   Juan Dyspnea Scale 0   Data Measured During the Walk   Heart Rate 56   O2 Saturation 94   Need Additional O2 Flow Rate Rows Yes   O2 Saturation 93   O2 Saturation 91   O2 Saturation 91   O2 Saturation 91   O2 Saturation 91   Any Problems While Exercising none   Juan Dyspnea Scale 0   Data Measured Immediately After Walk   Did Patient Stop or Pause Before 6 Minutes No   Predicted Distance (m) 436 Meters   Total Distance Walked (m) 360 Meters   % Predicted 82.57   Heart Rate 62   Blood Pressure (!) 165/99   O2 Saturation 92   Juan Dyspnea Scale 0

## 2024-04-25 NOTE — PROCEDURES
[address] [address]                           PULMONARY FUNCTION      PATIENT NAME: IMER BOLAND               : 1950  MED REC NO: [id number]                        ROOM:   ACCOUNT NO: [id number]                       ADMIT DATE: 2024  PROVIDER: Zack Fitch MD      DATE OF PROCEDURE:  2024    The spirometry shows mild reduction in FVC and moderate reduction in FEV1.  Post bronchodilator, the FEV1/FVC is normal.  The maximum voluntary ventilation is 69% of the predicted value.    According to Z-score criteria, the FVC and FEV1 are outside the area of curve, more than standard deviation of -1.64 whereas post bronchodilator, FEV1/FVC as per Z-score criteria is under the area of curve within standard deviation of -1.64.  After bronchodilator therapy, there is improvement seen in spirometry.    The lung volume study shows normal TLC, but increased RV.    The diffusion capacity is normal.    IMPRESSION:  The above study shows moderate restrictive ventilatory impairment with air trapping and there is improvement after bronchodilator therapy.  The differential diagnosis includes PRISM, which means normal ratio of FEV1/FVC, but impaired spirometry and this can be seen in about 12% of the patients with chronic obstructive pulmonary disease.  This patient has 38 pack years of smoking and the study may be consistent with a PRISm, the CAT scan done in the past also showed emphysema.  Clinical correlation is needed.          ZACK FITCH MD      D:  2024 10:01:34     T:  2024 10:27:51     ISABELLE/JACQUELIN  Job #:  [id number]     Doc#:  [id number]

## 2024-05-08 PROBLEM — Z12.11 ENCOUNTER FOR SCREENING COLONOSCOPY: Status: RESOLVED | Noted: 2024-04-08 | Resolved: 2024-05-08

## 2024-05-09 ENCOUNTER — OFFICE VISIT (OUTPATIENT)
Dept: PULMONOLOGY | Age: 74
End: 2024-05-09
Payer: MEDICARE

## 2024-05-09 VITALS
WEIGHT: 166 LBS | OXYGEN SATURATION: 95 % | BODY MASS INDEX: 26.06 KG/M2 | DIASTOLIC BLOOD PRESSURE: 110 MMHG | TEMPERATURE: 97.8 F | HEART RATE: 99 BPM | HEIGHT: 67 IN | SYSTOLIC BLOOD PRESSURE: 170 MMHG

## 2024-05-09 DIAGNOSIS — J44.9 CHRONIC OBSTRUCTIVE PULMONARY DISEASE, UNSPECIFIED COPD TYPE (HCC): Primary | ICD-10-CM

## 2024-05-09 PROBLEM — R91.1 LUNG NODULE SEEN ON IMAGING STUDY: Status: RESOLVED | Noted: 2024-02-07 | Resolved: 2024-05-09

## 2024-05-09 PROCEDURE — 3077F SYST BP >= 140 MM HG: CPT | Performed by: INTERNAL MEDICINE

## 2024-05-09 PROCEDURE — 1036F TOBACCO NON-USER: CPT | Performed by: INTERNAL MEDICINE

## 2024-05-09 PROCEDURE — G0009 ADMIN PNEUMOCOCCAL VACCINE: HCPCS | Performed by: INTERNAL MEDICINE

## 2024-05-09 PROCEDURE — 1123F ACP DISCUSS/DSCN MKR DOCD: CPT | Performed by: INTERNAL MEDICINE

## 2024-05-09 PROCEDURE — 3080F DIAST BP >= 90 MM HG: CPT | Performed by: INTERNAL MEDICINE

## 2024-05-09 PROCEDURE — 90677 PCV20 VACCINE IM: CPT | Performed by: INTERNAL MEDICINE

## 2024-05-09 PROCEDURE — 1090F PRES/ABSN URINE INCON ASSESS: CPT | Performed by: INTERNAL MEDICINE

## 2024-05-09 PROCEDURE — G8417 CALC BMI ABV UP PARAM F/U: HCPCS | Performed by: INTERNAL MEDICINE

## 2024-05-09 PROCEDURE — 3023F SPIROM DOC REV: CPT | Performed by: INTERNAL MEDICINE

## 2024-05-09 PROCEDURE — 99214 OFFICE O/P EST MOD 30 MIN: CPT | Performed by: INTERNAL MEDICINE

## 2024-05-09 PROCEDURE — 3017F COLORECTAL CA SCREEN DOC REV: CPT | Performed by: INTERNAL MEDICINE

## 2024-05-09 PROCEDURE — G8427 DOCREV CUR MEDS BY ELIG CLIN: HCPCS | Performed by: INTERNAL MEDICINE

## 2024-05-09 PROCEDURE — G8399 PT W/DXA RESULTS DOCUMENT: HCPCS | Performed by: INTERNAL MEDICINE

## 2024-05-09 RX ORDER — FLUTICASONE FUROATE, UMECLIDINIUM BROMIDE AND VILANTEROL TRIFENATATE 100; 62.5; 25 UG/1; UG/1; UG/1
1 POWDER RESPIRATORY (INHALATION) DAILY
Qty: 2 EACH | Refills: 3 | Status: SHIPPED | OUTPATIENT
Start: 2024-05-09

## 2024-05-09 RX ORDER — ALBUTEROL SULFATE 90 UG/1
2 AEROSOL, METERED RESPIRATORY (INHALATION) EVERY 6 HOURS PRN
Qty: 1 EACH | Refills: 3 | Status: SHIPPED | OUTPATIENT
Start: 2024-05-09

## 2024-05-09 NOTE — PROGRESS NOTES
Patient to follow up with physician in 6 months. Prevnar 20 was given to the patient in the Left Deltoid under the direction of Dr. Schwarz. Sample of Trelegy 100 mcg was given to the patient with instructions.   
daily, Disp: 90 capsule, Rfl: 1    pravastatin (PRAVACHOL) 40 MG tablet, Take 1 tablet by mouth daily, Disp: 90 tablet, Rfl: 1    glucose monitoring (FREESTYLE FREEDOM) kit, 1 kit by Does not apply route daily Please provide what the insurance will cover, Disp: 1 kit, Rfl: 0    blood glucose monitor strips, Test 1 times a day & as needed for symptoms of irregular blood glucose due to hypoglycemia, Disp: 100 strip, Rfl: 3    Lancets MISC, 1 each by Does not apply route daily, Disp: 100 each, Rfl: 5    EPINEPHrine (EPIPEN) 0.3 MG/0.3ML SOAJ injection, , Disp: , Rfl:     aspirin 81 MG tablet, Take 1 tablet by mouth every evening, Disp: , Rfl:      I reviewed the patient's past medical and surgical history, Medications and Allergies.    Patient seen today for: COPD       Review of Systems      Physical Exam:  Vitals:    05/09/24 0844   BP: (!) 170/110   Pulse: 99   Temp: 97.8 °F (36.6 °C)   SpO2: 95%   Weight: 75.3 kg (166 lb)   Height: 1.702 m (5' 7\")       Physical Exam  Constitutional:       Appearance: Normal appearance.   HENT:      Head: Normocephalic and atraumatic.      Nose: Nose normal.      Mouth/Throat:      Mouth: Mucous membranes are moist.      Pharynx: Oropharynx is clear.   Eyes:      Extraocular Movements: Extraocular movements intact.      Conjunctiva/sclera: Conjunctivae normal.      Pupils: Pupils are equal, round, and reactive to light.   Cardiovascular:      Rate and Rhythm: Normal rate and regular rhythm.      Pulses: Normal pulses.      Heart sounds: Normal heart sounds.   Pulmonary:      Effort: Pulmonary effort is normal.      Breath sounds: Wheezing present.   Abdominal:      General: Bowel sounds are normal.      Palpations: Abdomen is soft.   Musculoskeletal:         General: Normal range of motion.      Cervical back: Normal range of motion and neck supple.   Skin:     General: Skin is warm.   Neurological:      General: No focal deficit present.      Mental Status: She is alert and

## 2024-05-31 RX ORDER — AMLODIPINE BESYLATE 5 MG/1
5 TABLET ORAL DAILY
Qty: 90 TABLET | Refills: 0 | OUTPATIENT
Start: 2024-05-31

## 2024-07-30 ENCOUNTER — APPOINTMENT (OUTPATIENT)
Dept: GENERAL RADIOLOGY | Age: 74
End: 2024-07-30
Payer: MEDICARE

## 2024-07-30 ENCOUNTER — HOSPITAL ENCOUNTER (EMERGENCY)
Age: 74
Discharge: HOME | End: 2024-07-30
Attending: FAMILY MEDICINE
Payer: MEDICARE

## 2024-07-30 VITALS
OXYGEN SATURATION: 98 % | DIASTOLIC BLOOD PRESSURE: 90 MMHG | RESPIRATION RATE: 18 BRPM | TEMPERATURE: 97.3 F | SYSTOLIC BLOOD PRESSURE: 154 MMHG | HEART RATE: 92 BPM

## 2024-07-30 DIAGNOSIS — M79.89 BILATERAL HAND SWELLING: Primary | ICD-10-CM

## 2024-07-30 PROCEDURE — 6360000002 HC RX W HCPCS: Performed by: FAMILY MEDICINE

## 2024-07-30 PROCEDURE — 99283 EMERGENCY DEPT VISIT LOW MDM: CPT

## 2024-07-30 PROCEDURE — 73130 X-RAY EXAM OF HAND: CPT

## 2024-07-30 PROCEDURE — 96372 THER/PROPH/DIAG INJ SC/IM: CPT

## 2024-07-30 RX ORDER — DEXAMETHASONE SODIUM PHOSPHATE 10 MG/ML
10 INJECTION, SOLUTION INTRAMUSCULAR; INTRAVENOUS ONCE
Status: COMPLETED | OUTPATIENT
Start: 2024-07-30 | End: 2024-07-30

## 2024-07-30 RX ORDER — METHYLPREDNISOLONE 4 MG/1
TABLET ORAL
Qty: 1 KIT | Refills: 0 | Status: SHIPPED | OUTPATIENT
Start: 2024-07-30 | End: 2024-08-05

## 2024-07-30 RX ADMIN — DEXAMETHASONE SODIUM PHOSPHATE 10 MG: 10 INJECTION, SOLUTION INTRAMUSCULAR; INTRAVENOUS at 15:38

## 2024-07-30 ASSESSMENT — PAIN DESCRIPTION - DESCRIPTORS: DESCRIPTORS: ACHING;THROBBING

## 2024-07-30 ASSESSMENT — PAIN DESCRIPTION - LOCATION: LOCATION: HAND;ARM;NECK

## 2024-07-30 ASSESSMENT — PAIN - FUNCTIONAL ASSESSMENT: PAIN_FUNCTIONAL_ASSESSMENT: 0-10

## 2024-07-30 ASSESSMENT — PAIN DESCRIPTION - ORIENTATION: ORIENTATION: RIGHT;LEFT

## 2024-07-30 ASSESSMENT — PAIN SCALES - GENERAL: PAINLEVEL_OUTOF10: 5

## 2024-07-30 NOTE — ED PROVIDER NOTES
Urgent Care Encounter       Patient: Cornelia Kemp  MRN: [id number]  : 1950  Date of Evaluation: 2024  ED Provider: Rosetta Jacobo MD    Chief Complaint       Chief Complaint   Patient presents with    Arm Swelling     Pt states BL hand swelling with pain up the right arm gradually over the past month. Also states stiff neck.     NIDIA Kemp is a 73 y.o. female who presents to the [facility] [place] (Urgent care Facility)    She has swelling of the dorsal surface of the hands bilaterally over the last one month, she says its not acute and has been happening gradually which is really strange, no other systemic symptoms. No pmhx of rheumatoid arthritis. Inspection dorsal surface of hand is swollen, ROM of fingers is ok , there is no redness or discharge indicating there is no cellulitis as such but some type of inflammation that could be autoimmune in nature   ROS:     At least 6 systems reviewed and otherwise acutely negative except as in the [place].  Review of Systems      Past History     Past Medical History:   Diagnosis Date    Allergic rhinitis     Angio-edema 2021    Asthma     Chronic back pain     HURT BACK IN     COPD (chronic obstructive pulmonary disease) (HCC)     Coronary artery calcification seen on CAT scan 2024    Depression     Diabetes mellitus (HCC)     GERD (gastroesophageal reflux disease)     Hyperlipidemia     Hypertension     Osteoarthritis     Type 2 diabetes mellitus without complication (HCC)      Past Surgical History:   Procedure Laterality Date    APPENDECTOMY      BUNIONECTOMY      rt    CARDIAC CATHETERIZATION       states was normal    CARPAL TUNNEL RELEASE      rt hand    HAMMER TOE SURGERY      rt    KNEE JOINT MANIPULATION Right 6/18/15    TOTAL KNEE ARTHROPLASTY  May, 2015    Right    TUBAL LIGATION       Social History     Socioeconomic History    Marital status:      Spouse name: None    Number of

## 2024-08-07 ENCOUNTER — HOSPITAL ENCOUNTER (OUTPATIENT)
Age: 74
Discharge: HOME | End: 2024-08-07
Payer: MEDICARE

## 2024-08-07 DIAGNOSIS — Z13.6 SCREENING FOR CARDIOVASCULAR CONDITION: ICD-10-CM

## 2024-08-07 DIAGNOSIS — I10 ESSENTIAL HYPERTENSION: ICD-10-CM

## 2024-08-07 LAB
ALBUMIN SERPL-MCNC: 3.8 G/DL (ref 3.5–5.2)
ALP SERPL-CCNC: 72 U/L (ref 35–104)
ALT SERPL-CCNC: 25 U/L (ref 0–32)
ANION GAP SERPL CALCULATED.3IONS-SCNC: 13 MMOL/L (ref 7–16)
AST SERPL-CCNC: 27 U/L (ref 0–31)
BASOPHILS # BLD: 0.04 K/UL (ref 0–0.2)
BASOPHILS NFR BLD: 0 % (ref 0–2)
BILIRUB SERPL-MCNC: 0.3 MG/DL (ref 0–1.2)
BUN SERPL-MCNC: 18 MG/DL (ref 6–23)
CALCIUM SERPL-MCNC: 9.4 MG/DL (ref 8.6–10.2)
CHLORIDE SERPL-SCNC: 98 MMOL/L (ref 98–107)
CHOLEST SERPL-MCNC: 169 MG/DL
CO2 SERPL-SCNC: 26 MMOL/L (ref 22–29)
CREAT SERPL-MCNC: 0.7 MG/DL (ref 0.5–1)
EOSINOPHIL # BLD: 0.59 K/UL (ref 0.05–0.5)
EOSINOPHILS RELATIVE PERCENT: 5 % (ref 0–6)
ERYTHROCYTE [DISTWIDTH] IN BLOOD BY AUTOMATED COUNT: 14.7 % (ref 12–16)
GFR, ESTIMATED: >90 ML/MIN/1.73M2
GLUCOSE SERPL-MCNC: 137 MG/DL (ref 74–99)
HCT VFR BLD AUTO: 36.3 % (ref 34–48)
HDLC SERPL-MCNC: 53 MG/DL
HGB BLD-MCNC: 11.7 G/DL (ref 11.5–15.5)
IMM GRANULOCYTES # BLD AUTO: 0.23 K/UL (ref 0–0.58)
IMM GRANULOCYTES NFR BLD: 2 % (ref 0–5)
LDLC SERPL CALC-MCNC: 79 MG/DL
LYMPHOCYTES NFR BLD: 2.67 K/UL (ref 1.5–4)
LYMPHOCYTES RELATIVE PERCENT: 23 % (ref 20–42)
MCH RBC QN AUTO: 26.4 PG (ref 26–35)
MCHC RBC AUTO-ENTMCNC: 32.2 G/DL (ref 32–34.5)
MCV RBC AUTO: 81.8 FL (ref 80–99.9)
MONOCYTES NFR BLD: 0.88 K/UL (ref 0.1–0.95)
MONOCYTES NFR BLD: 8 % (ref 2–12)
NEUTROPHILS NFR BLD: 62 % (ref 43–80)
NEUTS SEG NFR BLD: 7.19 K/UL (ref 1.8–7.3)
PLATELET # BLD AUTO: 229 K/UL (ref 130–450)
PMV BLD AUTO: 11.1 FL (ref 7–12)
POTASSIUM SERPL-SCNC: 4.5 MMOL/L (ref 3.5–5)
PROT SERPL-MCNC: 6.8 G/DL (ref 6.4–8.3)
RBC # BLD AUTO: 4.44 M/UL (ref 3.5–5.5)
SODIUM SERPL-SCNC: 137 MMOL/L (ref 132–146)
TRIGL SERPL-MCNC: 183 MG/DL
VLDLC SERPL CALC-MCNC: 37 MG/DL
WBC OTHER # BLD: 11.6 K/UL (ref 4.5–11.5)

## 2024-08-07 PROCEDURE — 85025 COMPLETE CBC W/AUTO DIFF WBC: CPT

## 2024-08-07 PROCEDURE — 80061 LIPID PANEL: CPT

## 2024-08-07 PROCEDURE — 80053 COMPREHEN METABOLIC PANEL: CPT

## 2024-08-12 ENCOUNTER — OFFICE VISIT (OUTPATIENT)
Dept: FAMILY MEDICINE CLINIC | Age: 74
End: 2024-08-12
Payer: MEDICARE

## 2024-08-12 VITALS
SYSTOLIC BLOOD PRESSURE: 128 MMHG | HEIGHT: 67 IN | DIASTOLIC BLOOD PRESSURE: 88 MMHG | HEART RATE: 106 BPM | OXYGEN SATURATION: 96 % | BODY MASS INDEX: 25.58 KG/M2 | WEIGHT: 163 LBS | RESPIRATION RATE: 18 BRPM | TEMPERATURE: 97.3 F

## 2024-08-12 DIAGNOSIS — Z12.31 SCREENING MAMMOGRAM FOR BREAST CANCER: ICD-10-CM

## 2024-08-12 DIAGNOSIS — F33.0 MAJOR DEPRESSIVE DISORDER, RECURRENT, MILD (HCC): ICD-10-CM

## 2024-08-12 DIAGNOSIS — R53.83 OTHER FATIGUE: ICD-10-CM

## 2024-08-12 DIAGNOSIS — Z78.0 ENCOUNTER FOR OSTEOPOROSIS SCREENING IN ASYMPTOMATIC POSTMENOPAUSAL PATIENT: ICD-10-CM

## 2024-08-12 DIAGNOSIS — M79.89 BILATERAL HAND SWELLING: ICD-10-CM

## 2024-08-12 DIAGNOSIS — E78.2 MIXED HYPERLIPIDEMIA: ICD-10-CM

## 2024-08-12 DIAGNOSIS — Z13.820 ENCOUNTER FOR OSTEOPOROSIS SCREENING IN ASYMPTOMATIC POSTMENOPAUSAL PATIENT: ICD-10-CM

## 2024-08-12 DIAGNOSIS — E11.9 TYPE 2 DIABETES MELLITUS WITHOUT COMPLICATION, WITHOUT LONG-TERM CURRENT USE OF INSULIN (HCC): Primary | ICD-10-CM

## 2024-08-12 DIAGNOSIS — I10 ESSENTIAL HYPERTENSION: ICD-10-CM

## 2024-08-12 LAB
CHP ED QC CHECK: ABNORMAL
GLUCOSE BLD-MCNC: 150 MG/DL
HBA1C MFR BLD: 7.1 %

## 2024-08-12 PROCEDURE — 1036F TOBACCO NON-USER: CPT | Performed by: NURSE PRACTITIONER

## 2024-08-12 PROCEDURE — 2022F DILAT RTA XM EVC RTNOPTHY: CPT | Performed by: NURSE PRACTITIONER

## 2024-08-12 PROCEDURE — 3051F HG A1C>EQUAL 7.0%<8.0%: CPT | Performed by: NURSE PRACTITIONER

## 2024-08-12 PROCEDURE — G8427 DOCREV CUR MEDS BY ELIG CLIN: HCPCS | Performed by: NURSE PRACTITIONER

## 2024-08-12 PROCEDURE — G8399 PT W/DXA RESULTS DOCUMENT: HCPCS | Performed by: NURSE PRACTITIONER

## 2024-08-12 PROCEDURE — 82962 GLUCOSE BLOOD TEST: CPT | Performed by: NURSE PRACTITIONER

## 2024-08-12 PROCEDURE — G8417 CALC BMI ABV UP PARAM F/U: HCPCS | Performed by: NURSE PRACTITIONER

## 2024-08-12 PROCEDURE — 1090F PRES/ABSN URINE INCON ASSESS: CPT | Performed by: NURSE PRACTITIONER

## 2024-08-12 PROCEDURE — 99214 OFFICE O/P EST MOD 30 MIN: CPT | Performed by: NURSE PRACTITIONER

## 2024-08-12 PROCEDURE — 1123F ACP DISCUSS/DSCN MKR DOCD: CPT | Performed by: NURSE PRACTITIONER

## 2024-08-12 PROCEDURE — 3017F COLORECTAL CA SCREEN DOC REV: CPT | Performed by: NURSE PRACTITIONER

## 2024-08-12 PROCEDURE — 3079F DIAST BP 80-89 MM HG: CPT | Performed by: NURSE PRACTITIONER

## 2024-08-12 PROCEDURE — 3074F SYST BP LT 130 MM HG: CPT | Performed by: NURSE PRACTITIONER

## 2024-08-12 PROCEDURE — 83036 HEMOGLOBIN GLYCOSYLATED A1C: CPT | Performed by: NURSE PRACTITIONER

## 2024-08-12 ASSESSMENT — ENCOUNTER SYMPTOMS
VOICE CHANGE: 0
NAUSEA: 0
BACK PAIN: 0
DIARRHEA: 0
SINUS PRESSURE: 0
FACIAL SWELLING: 0
ABDOMINAL PAIN: 0
CHEST TIGHTNESS: 0
COUGH: 0
SINUS PAIN: 0
RHINORRHEA: 0
SHORTNESS OF BREATH: 0
SORE THROAT: 0
CONSTIPATION: 0
COLOR CHANGE: 0
TROUBLE SWALLOWING: 0
VOMITING: 0
WHEEZING: 0

## 2024-08-12 NOTE — ASSESSMENT & PLAN NOTE
well controlled and no significant medication side effects noted A1c 7.1 %, GFR >60 continue metformin  mg TID, glipizide XL 5 mg daily., labs reviewed CMP, CBCD, lipids stable in epic 2024,    BS log reviewed with patient   Monitor BS at different times: 1 day fasting, then next day 2 hours after lunch, next day 2 hours after dinner, next day at bedtime then start over and log all values.  Bring log to next appointment  Discussed hypoglycemia prevention and treatment, take medication regularly don't skip doses or double up.   Foot exam every day; wash and dry well between toes, look for any redness, cracks, wounds notify provider if any problems occur. Be sure to wear hard sole shoe at all times and not go barefooted. Wear proper fitting shoes to prevent ulcer, callous formation.   Reminder for Podiatry visits Q 2 Months for toenail care if needed  Reminder for annual Eye exam  Reminder to keep vaccines up dated  Exercise 30 minutes daily for 150 minutes a week  Recommend Diabetic Education Classes if you have not already attended  Stay up to date on all immunizations.

## 2024-08-12 NOTE — ASSESSMENT & PLAN NOTE
well controlled, no significant side effects from medication noted. Continue amlodipine 5 mg daily, labs reviewed:CMP, CBCD, Lipids 8//24 stable   -Discussed taking medication as directed every day around the same time. Do not double up if skipped or missed doses.   -Discussed exercising daily 30 minutes 5 times a week for 150 minutes weekly to help with stress reduction and weight reduction if needed.  -Discussed low sodium diet.  -Discussed limiting caffeine consumption and tobacco cessation and the effects they have on the heart and blood pressure

## 2024-08-12 NOTE — ASSESSMENT & PLAN NOTE
Imaging of both hands reviewed today OA, she was told she had RA check labs in a month since she just had steroid injection last week.

## 2024-08-12 NOTE — ASSESSMENT & PLAN NOTE
Labs are unremarkable, discussed with A1c up check BS when she feels fatigue. She feels her depression is well controlled.

## 2024-08-12 NOTE — ASSESSMENT & PLAN NOTE
well controlled and no significant medication side effects noted,continue Pravastatin 40 mg daily labs reviewed Lipid, CMP, CBCD 8/7/24 stable  -Discussed low fat diet, limit fast food, goodies, breads and pastas if consuming several days a week,  limit alcohol consumption as this combined with statin can cause liver problems.  -Discussed weight reduction and exercise 30 minutes 5 days a week for total of 150 minutes weekly.  -Discussed CoQ10 as directed  -Discussed if any unusual muscle aching/pain to contact the office, discussed medication and risk of muscle pain/damage from Rhabdomyolysis

## 2024-08-12 NOTE — PROGRESS NOTES
well developed and well nourished, in no acute distress  SKIN: Warm and dry, intact without any rash, masses or lesions  HEAD: normocephalic, atraumatic  ENT: tympanic membranes, external ear and ear canal normal bilaterally, normal hearing,    Throat: clear, tongue midline, tonsils 1+, drainage, no masses or lesions noted, dentures  Neck: supple and non-tender without mass, trachea midline, no cervical lymphadenopathy, no bruit, no thyromegaly or nodules  Cardiovascular: regular rate and regular rhythm, normal S1 and S2,  no murmurs, rubs, clicks, or gallop. Distal pulses intact, no carotid bruits. No edema  Pulmonary/Chest: clear to auscultation bilaterally, no wheezes, rales or rhonchi, normal air movement, no respiratory distress  Abdomen: soft, non-tender, non-distended, normal bowel sounds, no masses or hepatosplenomegaly  Musculoskeletal: Normal ROM, + joint swelling, deformity and  tenderness   Neurologic:  gait, coordination and speech normal  Extremities: no clubbing, cyanosis, or edema.   Psychiatric: Good eye contact, normal mood and affect, answers questions appropriately       I have reviewed my findings and recommendations with Cornelia Kemp.    Karime Alejandra, APRN - CNP, NP-C, FNP-BC

## 2024-08-22 DIAGNOSIS — I10 ESSENTIAL HYPERTENSION: ICD-10-CM

## 2024-08-22 RX ORDER — DILTIAZEM HYDROCHLORIDE 180 MG/1
180 CAPSULE, COATED, EXTENDED RELEASE ORAL DAILY
Qty: 90 CAPSULE | Refills: 1 | OUTPATIENT
Start: 2024-08-22

## 2024-08-27 ENCOUNTER — HOSPITAL ENCOUNTER (OUTPATIENT)
Dept: MAMMOGRAPHY | Age: 74
Discharge: HOME | End: 2024-08-29
Payer: MEDICARE

## 2024-08-27 VITALS — WEIGHT: 166 LBS | BODY MASS INDEX: 26.06 KG/M2 | HEIGHT: 67 IN

## 2024-08-27 DIAGNOSIS — Z78.0 ENCOUNTER FOR OSTEOPOROSIS SCREENING IN ASYMPTOMATIC POSTMENOPAUSAL PATIENT: ICD-10-CM

## 2024-08-27 DIAGNOSIS — Z12.31 SCREENING MAMMOGRAM FOR BREAST CANCER: ICD-10-CM

## 2024-08-27 DIAGNOSIS — Z13.820 ENCOUNTER FOR OSTEOPOROSIS SCREENING IN ASYMPTOMATIC POSTMENOPAUSAL PATIENT: ICD-10-CM

## 2024-08-27 PROCEDURE — 77080 DXA BONE DENSITY AXIAL: CPT

## 2024-08-27 PROCEDURE — 77063 BREAST TOMOSYNTHESIS BI: CPT

## 2024-08-27 NOTE — RESULT ENCOUNTER NOTE
IMPRESSION:  No mammographic evidence of malignancy.    Annual screening mammography recommended      Negative.    OVERALL ASSESSMENT - NEGATIVE

## 2024-09-05 ENCOUNTER — TELEPHONE (OUTPATIENT)
Dept: FAMILY MEDICINE CLINIC | Age: 74
End: 2024-09-05

## 2024-09-05 DIAGNOSIS — E78.2 MIXED HYPERLIPIDEMIA: ICD-10-CM

## 2024-09-05 DIAGNOSIS — K21.9 GASTROESOPHAGEAL REFLUX DISEASE WITHOUT ESOPHAGITIS: ICD-10-CM

## 2024-09-05 DIAGNOSIS — E11.9 TYPE 2 DIABETES MELLITUS WITHOUT COMPLICATION, WITHOUT LONG-TERM CURRENT USE OF INSULIN (HCC): ICD-10-CM

## 2024-09-05 DIAGNOSIS — F32.5 MAJOR DEPRESSIVE DISORDER WITH SINGLE EPISODE, IN FULL REMISSION (HCC): ICD-10-CM

## 2024-09-05 DIAGNOSIS — I10 ESSENTIAL HYPERTENSION: ICD-10-CM

## 2024-09-05 RX ORDER — CITALOPRAM HYDROBROMIDE 20 MG/1
20 TABLET ORAL DAILY
Qty: 90 TABLET | Refills: 1 | Status: SHIPPED | OUTPATIENT
Start: 2024-09-05

## 2024-09-05 RX ORDER — PRAVASTATIN SODIUM 40 MG
40 TABLET ORAL DAILY
Qty: 90 TABLET | Refills: 1 | Status: SHIPPED | OUTPATIENT
Start: 2024-09-05

## 2024-09-05 RX ORDER — OMEPRAZOLE 40 MG/1
40 CAPSULE, DELAYED RELEASE ORAL DAILY
Qty: 90 CAPSULE | Refills: 1 | Status: SHIPPED | OUTPATIENT
Start: 2024-09-05

## 2024-09-05 RX ORDER — DILTIAZEM HYDROCHLORIDE 180 MG/1
180 CAPSULE, COATED, EXTENDED RELEASE ORAL DAILY
Qty: 90 CAPSULE | Refills: 1 | Status: SHIPPED | OUTPATIENT
Start: 2024-09-05

## 2024-09-05 RX ORDER — METFORMIN HCL 500 MG
TABLET, EXTENDED RELEASE 24 HR ORAL
Qty: 270 TABLET | Refills: 1 | Status: SHIPPED | OUTPATIENT
Start: 2024-09-05

## 2024-09-05 RX ORDER — GLIPIZIDE 5 MG/1
5 TABLET, FILM COATED, EXTENDED RELEASE ORAL DAILY
Qty: 90 TABLET | Refills: 1 | Status: SHIPPED | OUTPATIENT
Start: 2024-09-05

## 2024-09-05 NOTE — TELEPHONE ENCOUNTER
Pt called for refills  Last seen 8/12/2024  Next appt 11/14/2024    Requested Prescriptions     Pending Prescriptions Disp Refills    citalopram (CELEXA) 20 MG tablet 90 tablet 1     Sig: Take 1 tablet by mouth daily    dilTIAZem (CARDIZEM CD) 180 MG extended release capsule 90 capsule 1     Sig: Take 1 capsule by mouth daily    glipiZIDE (GLUCOTROL XL) 5 MG extended release tablet 90 tablet 1     Sig: Take 1 tablet by mouth daily    metFORMIN (GLUCOPHAGE-XR) 500 MG extended release tablet 270 tablet 1     Sig: TAKE 1 TABLET 3 TIMES A DAYWITH MEALS    omeprazole (PRILOSEC) 40 MG delayed release capsule 90 capsule 1     Sig: Take 1 capsule by mouth daily    pravastatin (PRAVACHOL) 40 MG tablet 90 tablet 1     Sig: Take 1 tablet by mouth daily      [place]

## 2024-09-05 NOTE — TELEPHONE ENCOUNTER
Pt did not answer call. Voicemail was left stating Per Karime Alejandra patient labs were normal and that she can call back if she had any questions.      ----- Message from LOLY Birmingham CNP sent at 9/3/2024  6:53 AM EDT -----  Please notify patient that their lab results are normal.

## 2024-09-10 ENCOUNTER — HOSPITAL ENCOUNTER (OUTPATIENT)
Age: 74
Discharge: HOME | End: 2024-09-10
Payer: MEDICARE

## 2024-09-10 DIAGNOSIS — M79.89 BILATERAL HAND SWELLING: ICD-10-CM

## 2024-09-10 LAB
CRP SERPL HS-MCNC: 13 MG/L (ref 0–5)
ERYTHROCYTE [SEDIMENTATION RATE] IN BLOOD BY WESTERGREN METHOD: 33 MM/HR (ref 0–20)
RHEUMATOID FACT SER NEPH-ACNC: 10 IU/ML (ref 0–13)

## 2024-09-10 PROCEDURE — 86140 C-REACTIVE PROTEIN: CPT

## 2024-09-10 PROCEDURE — 36415 COLL VENOUS BLD VENIPUNCTURE: CPT

## 2024-09-10 PROCEDURE — 85652 RBC SED RATE AUTOMATED: CPT

## 2024-09-10 PROCEDURE — 86431 RHEUMATOID FACTOR QUANT: CPT

## 2024-09-10 PROCEDURE — 86039 ANTINUCLEAR ANTIBODIES (ANA): CPT

## 2024-09-10 PROCEDURE — 86038 ANTINUCLEAR ANTIBODIES: CPT

## 2024-09-11 LAB — ANA SER QL IA: NEGATIVE

## 2024-09-12 ENCOUNTER — TELEPHONE (OUTPATIENT)
Dept: FAMILY MEDICINE CLINIC | Age: 74
End: 2024-09-12

## 2024-09-30 ENCOUNTER — OFFICE VISIT (OUTPATIENT)
Dept: FAMILY MEDICINE CLINIC | Age: 74
End: 2024-09-30
Payer: MEDICARE

## 2024-09-30 VITALS
HEIGHT: 67 IN | SYSTOLIC BLOOD PRESSURE: 100 MMHG | HEART RATE: 99 BPM | BODY MASS INDEX: 25.8 KG/M2 | RESPIRATION RATE: 20 BRPM | TEMPERATURE: 97.6 F | WEIGHT: 164.4 LBS | OXYGEN SATURATION: 99 % | DIASTOLIC BLOOD PRESSURE: 72 MMHG

## 2024-09-30 DIAGNOSIS — M79.89 BILATERAL HAND SWELLING: Primary | ICD-10-CM

## 2024-09-30 DIAGNOSIS — R70.0 ELEVATED SED RATE: ICD-10-CM

## 2024-09-30 DIAGNOSIS — M25.541 PAIN OF JOINT OF BOTH HANDS: ICD-10-CM

## 2024-09-30 DIAGNOSIS — R79.82 ELEVATED C-REACTIVE PROTEIN (CRP): ICD-10-CM

## 2024-09-30 DIAGNOSIS — M25.542 PAIN OF JOINT OF BOTH HANDS: ICD-10-CM

## 2024-09-30 PROCEDURE — 99214 OFFICE O/P EST MOD 30 MIN: CPT | Performed by: NURSE PRACTITIONER

## 2024-09-30 PROCEDURE — 1090F PRES/ABSN URINE INCON ASSESS: CPT | Performed by: NURSE PRACTITIONER

## 2024-09-30 PROCEDURE — G8427 DOCREV CUR MEDS BY ELIG CLIN: HCPCS | Performed by: NURSE PRACTITIONER

## 2024-09-30 PROCEDURE — G8399 PT W/DXA RESULTS DOCUMENT: HCPCS | Performed by: NURSE PRACTITIONER

## 2024-09-30 PROCEDURE — G8417 CALC BMI ABV UP PARAM F/U: HCPCS | Performed by: NURSE PRACTITIONER

## 2024-09-30 PROCEDURE — 1123F ACP DISCUSS/DSCN MKR DOCD: CPT | Performed by: NURSE PRACTITIONER

## 2024-09-30 PROCEDURE — 3074F SYST BP LT 130 MM HG: CPT | Performed by: NURSE PRACTITIONER

## 2024-09-30 PROCEDURE — 1036F TOBACCO NON-USER: CPT | Performed by: NURSE PRACTITIONER

## 2024-09-30 PROCEDURE — 3078F DIAST BP <80 MM HG: CPT | Performed by: NURSE PRACTITIONER

## 2024-09-30 PROCEDURE — 3017F COLORECTAL CA SCREEN DOC REV: CPT | Performed by: NURSE PRACTITIONER

## 2024-09-30 RX ORDER — EPINEPHRINE 0.3 MG/.3ML
INJECTION SUBCUTANEOUS
Qty: 1 ML | Refills: 1 | Status: CANCELLED | OUTPATIENT
Start: 2024-09-30

## 2024-09-30 ASSESSMENT — ENCOUNTER SYMPTOMS
COLOR CHANGE: 0
WHEEZING: 0
SHORTNESS OF BREATH: 0
BACK PAIN: 1
COUGH: 0

## 2024-09-30 NOTE — ASSESSMENT & PLAN NOTE
Elevated ESR and CRP check additional labs today since she had steroid back in July may need to see Rheumatology

## 2024-09-30 NOTE — PROGRESS NOTES
3      Surgical History:  has a past surgical history that includes Appendectomy; Carpal tunnel release; Tubal ligation; Bunionectomy; Hammer toe surgery; Cardiac catheterization; Total knee arthroplasty (May, 2015); and knee joint manipulation (Right, 6/18/15).  Social History:  reports that she quit smoking about 17 years ago. Her smoking use included cigarettes. She started smoking about 47 years ago. She has a 30 pack-year smoking history. She has been exposed to tobacco smoke. She quit smokeless tobacco use about 18 years ago. She reports current alcohol use of about 3.0 standard drinks of alcohol per week. She reports that she does not use drugs.  Family History: family history includes COPD in her paternal grandfather; Cancer in her brother and sister; Diabetes in her mother; Heart Attack in her father and paternal grandmother; Heart Disease (age of onset: 68) in her father; High Blood Pressure in her brother and sister; Kidney Disease in her mother; Liver Cancer in her maternal grandmother; Lung Cancer in her sister and son; Pneumonia in her maternal grandfather; Substance Abuse in her son.  I have reviewed Cornelia's allergies, medications, problem list, medical, social and family history and have updated as needed in the electronic medical record”    Review of Systems   Constitutional:  Negative for activity change, appetite change, chills, diaphoresis, fatigue, fever and unexpected weight change.   Respiratory:  Negative for cough, shortness of breath and wheezing.    Cardiovascular:  Negative for chest pain, palpitations and leg swelling.   Musculoskeletal:  Positive for arthralgias (hands bilateral pain and swelling, stiffness , shoulders, back), back pain and joint swelling.   Skin:  Negative for color change, pallor, rash and wound.         OBJECTIVE:     VS:  Wt Readings from Last 3 Encounters:   09/30/24 74.6 kg (164 lb 6.4 oz)   08/27/24 75.3 kg (166 lb)   08/12/24 73.9 kg (163 lb)

## 2024-09-30 NOTE — ASSESSMENT & PLAN NOTE
Gradually worsening, Elevated ESR and CRP check additional labs today since she had steroid back in July may need to see Rheumatology

## 2024-10-01 ENCOUNTER — HOSPITAL ENCOUNTER (OUTPATIENT)
Age: 74
Discharge: HOME | End: 2024-10-01
Payer: MEDICARE

## 2024-10-01 DIAGNOSIS — R79.82 ELEVATED C-REACTIVE PROTEIN (CRP): ICD-10-CM

## 2024-10-01 DIAGNOSIS — M25.542 PAIN OF JOINT OF BOTH HANDS: ICD-10-CM

## 2024-10-01 DIAGNOSIS — M25.541 PAIN OF JOINT OF BOTH HANDS: ICD-10-CM

## 2024-10-01 DIAGNOSIS — R70.0 ELEVATED SED RATE: ICD-10-CM

## 2024-10-01 DIAGNOSIS — M79.89 BILATERAL HAND SWELLING: ICD-10-CM

## 2024-10-01 LAB
IGA SERPL-MCNC: 443 MG/DL (ref 70–400)
IGG SERPL-MCNC: 1146 MG/DL (ref 700–1600)
IGM SERPL-MCNC: 48 MG/DL (ref 40–230)

## 2024-10-01 PROCEDURE — 86225 DNA ANTIBODY NATIVE: CPT

## 2024-10-01 PROCEDURE — 86160 COMPLEMENT ANTIGEN: CPT

## 2024-10-01 PROCEDURE — 82784 ASSAY IGA/IGD/IGG/IGM EACH: CPT

## 2024-10-02 LAB — ANTI DNA DOUBLE STRANDED: NEGATIVE

## 2024-10-02 NOTE — RESULT ENCOUNTER NOTE
IgA May be nonspecific await other testing, then repeat in 3 months if rising will need further evaluation

## 2024-10-04 LAB
C3 SERPL-MCNC: 165 MG/DL (ref 90–180)
C4 SERPL-MCNC: 35 MG/DL (ref 10–40)

## 2024-11-14 ENCOUNTER — OFFICE VISIT (OUTPATIENT)
Dept: PRIMARY CARE CLINIC | Age: 74
End: 2024-11-14

## 2024-11-14 VITALS
WEIGHT: 165.2 LBS | BODY MASS INDEX: 25.93 KG/M2 | HEART RATE: 106 BPM | TEMPERATURE: 98.3 F | DIASTOLIC BLOOD PRESSURE: 76 MMHG | HEIGHT: 67 IN | OXYGEN SATURATION: 95 % | SYSTOLIC BLOOD PRESSURE: 133 MMHG

## 2024-11-14 DIAGNOSIS — M19.041 ARTHRITIS OF BOTH HANDS: Primary | ICD-10-CM

## 2024-11-14 DIAGNOSIS — Z76.89 ESTABLISHING CARE WITH NEW DOCTOR, ENCOUNTER FOR: ICD-10-CM

## 2024-11-14 DIAGNOSIS — M19.042 ARTHRITIS OF BOTH HANDS: Primary | ICD-10-CM

## 2024-11-14 NOTE — PROGRESS NOTES
[facility]  Family Medicine      Patient:  Cornelia Kemp 74 y.o. female  Date of Service: 11/14/24      Chief complaint:   Chief Complaint   Patient presents with    New Patient    Swelling     Right hand          History of Present Illness   Cornelia Kemp is a 74 y.o. female who presents to the clinic with complaints as above.    Establish Care with New Physician  Medical History discussed and updated in chart  Surgical History discussed and updated in chart  Social History discussed and updated in chart  Medications reviewed and updated in chart as appropriate  Cardiology with Dr. Gonzalez  See below for Acute/Chronic conditions addressed today     Hand Swelling  For years with arthritis, July of this year it worsened  Worsening over time  Had xrs, showed severe thumb base arthritis  Was told at ED she had RA  No family hx Rheumatoid  Just hands, R>L that are bad right now  Trying ibuprofen 600mg, helps some  Right handed    Current Health Maintenance:  Health Maintenance   Topic Date Due    DTaP/Tdap/Td vaccine (1 - Tdap) Never done    Shingles vaccine (1 of 2) Never done    Respiratory Syncytial Virus (RSV) Pregnant or age 60 yrs+ (1 - 1-dose 60+ series) Never done    Flu vaccine (1) Never done    COVID-19 Vaccine (4 - 2023-24 season) 09/01/2024    Diabetic foot exam  02/07/2025    Diabetic Alb to Cr ratio (uACR) test  02/07/2025    Depression Monitoring  04/08/2025    Annual Wellness Visit (Medicare)  04/09/2025    Diabetic retinal exam  06/01/2025    Lipids  08/07/2025    GFR test (Diabetes, CKD 3-4, OR last GFR 15-59)  08/07/2025    A1C test (Diabetic or Prediabetic)  08/12/2025    Breast cancer screen  08/27/2026    Colorectal Cancer Screen  07/22/2029    DEXA (modify frequency per FRAX score)  Completed    Pneumococcal 65+ years Vaccine  Completed    Hepatitis A vaccine  Aged Out    Hepatitis B vaccine  Aged Out    Hib vaccine  Aged Out    Polio vaccine  Aged Out    Meningococcal (ACWY)

## 2024-12-26 ENCOUNTER — OFFICE VISIT (OUTPATIENT)
Dept: PRIMARY CARE CLINIC | Age: 74
End: 2024-12-26
Payer: MEDICARE

## 2024-12-26 VITALS
HEIGHT: 68 IN | HEART RATE: 96 BPM | DIASTOLIC BLOOD PRESSURE: 77 MMHG | OXYGEN SATURATION: 97 % | SYSTOLIC BLOOD PRESSURE: 129 MMHG | WEIGHT: 161.2 LBS | TEMPERATURE: 98.4 F | BODY MASS INDEX: 24.43 KG/M2

## 2024-12-26 DIAGNOSIS — M79.89 BILATERAL HAND SWELLING: Primary | ICD-10-CM

## 2024-12-26 PROCEDURE — 1123F ACP DISCUSS/DSCN MKR DOCD: CPT | Performed by: FAMILY MEDICINE

## 2024-12-26 PROCEDURE — 1036F TOBACCO NON-USER: CPT | Performed by: FAMILY MEDICINE

## 2024-12-26 PROCEDURE — 99213 OFFICE O/P EST LOW 20 MIN: CPT | Performed by: FAMILY MEDICINE

## 2024-12-26 PROCEDURE — G8484 FLU IMMUNIZE NO ADMIN: HCPCS | Performed by: FAMILY MEDICINE

## 2024-12-26 PROCEDURE — 1090F PRES/ABSN URINE INCON ASSESS: CPT | Performed by: FAMILY MEDICINE

## 2024-12-26 PROCEDURE — 3074F SYST BP LT 130 MM HG: CPT | Performed by: FAMILY MEDICINE

## 2024-12-26 PROCEDURE — G8399 PT W/DXA RESULTS DOCUMENT: HCPCS | Performed by: FAMILY MEDICINE

## 2024-12-26 PROCEDURE — G8427 DOCREV CUR MEDS BY ELIG CLIN: HCPCS | Performed by: FAMILY MEDICINE

## 2024-12-26 PROCEDURE — 1160F RVW MEDS BY RX/DR IN RCRD: CPT | Performed by: FAMILY MEDICINE

## 2024-12-26 PROCEDURE — 1159F MED LIST DOCD IN RCRD: CPT | Performed by: FAMILY MEDICINE

## 2024-12-26 PROCEDURE — 3078F DIAST BP <80 MM HG: CPT | Performed by: FAMILY MEDICINE

## 2024-12-26 PROCEDURE — 3017F COLORECTAL CA SCREEN DOC REV: CPT | Performed by: FAMILY MEDICINE

## 2024-12-26 PROCEDURE — G8420 CALC BMI NORM PARAMETERS: HCPCS | Performed by: FAMILY MEDICINE

## 2024-12-26 NOTE — PROGRESS NOTES
(VOLTAREN) 1 % GEL Apply 4 g topically 4 times daily as needed for Pain 100 g 0    albuterol sulfate HFA (VENTOLIN HFA) 108 (90 Base) MCG/ACT inhaler Inhale 2 puffs into the lungs every 6 hours as needed for Wheezing 1 each 3    glucose monitoring (FREESTYLE FREEDOM) kit 1 kit by Does not apply route daily Please provide what the insurance will cover 1 kit 0    blood glucose monitor strips Test 1 times a day & as needed for symptoms of irregular blood glucose due to hypoglycemia 100 strip 3    Lancets MISC 1 each by Does not apply route daily 100 each 5    aspirin 81 MG tablet Take 1 tablet by mouth every evening       No current facility-administered medications for this visit.         Review of Systems:   Review of Systems as per HPI    Physical Exam   Vitals: /77   Pulse 96   Temp 98.4 °F (36.9 °C) (Temporal)   Ht 1.715 m (5' 7.5\")   Wt 73.1 kg (161 lb 3.2 oz)   SpO2 97%   BMI 24.87 kg/m²   Physical Exam  Vitals and nursing note reviewed.   Constitutional:       General: She is not in acute distress.     Appearance: Normal appearance.   HENT:      Head: Normocephalic and atraumatic.   Eyes:      General:         Right eye: No discharge.         Left eye: No discharge.   Cardiovascular:      Rate and Rhythm: Normal rate and regular rhythm.      Heart sounds: No murmur heard.  Pulmonary:      Effort: Pulmonary effort is normal.      Breath sounds: Normal breath sounds. No wheezing.   Abdominal:      General: Bowel sounds are normal.      Palpations: Abdomen is soft.   Musculoskeletal:         General: Deformity (Enlarged knuckles on bilateral hands, minimally tender to palpation) present.      Cervical back: No rigidity.      Right lower leg: No edema.      Left lower leg: No edema.   Skin:     General: Skin is warm and dry.   Neurological:      Mental Status: She is alert and oriented to person, place, and time. Mental status is at baseline.         Assessment and Plan     1. Bilateral hand

## 2025-01-29 DIAGNOSIS — E78.2 MIXED HYPERLIPIDEMIA: ICD-10-CM

## 2025-01-29 DIAGNOSIS — F32.5 MAJOR DEPRESSIVE DISORDER WITH SINGLE EPISODE, IN FULL REMISSION (HCC): ICD-10-CM

## 2025-01-29 DIAGNOSIS — E11.9 TYPE 2 DIABETES MELLITUS WITHOUT COMPLICATION, WITHOUT LONG-TERM CURRENT USE OF INSULIN (HCC): ICD-10-CM

## 2025-01-29 DIAGNOSIS — K21.9 GASTROESOPHAGEAL REFLUX DISEASE WITHOUT ESOPHAGITIS: ICD-10-CM

## 2025-01-29 NOTE — TELEPHONE ENCOUNTER
Name of Medication(s) Requested:  Requested Prescriptions     Pending Prescriptions Disp Refills    citalopram (CELEXA) 20 MG tablet 90 tablet 2     Sig: Take 1 tablet by mouth daily    omeprazole (PRILOSEC) 40 MG delayed release capsule 90 capsule 2     Sig: Take 1 capsule by mouth daily    metFORMIN (GLUCOPHAGE-XR) 500 MG extended release tablet 270 tablet 2     Sig: TAKE 1 TABLET 3 TIMES A DAYWITH MEALS    glipiZIDE (GLUCOTROL XL) 5 MG extended release tablet 90 tablet 2     Sig: Take 1 tablet by mouth daily    pravastatin (PRAVACHOL) 40 MG tablet 90 tablet 2     Sig: Take 1 tablet by mouth daily       Medication is on current medication list Yes    Dosage and directions were verified? Yes    Quantity verified: 90 day supply     Pharmacy Verified?  Yes    Last Appointment:  12/26/2024    Future appts:  Future Appointments   Date Time Provider Department Center   6/26/2025  8:30 AM Sally Laguna DO [facility]        (If no appt send self scheduling link. .REFILLAPPT)  Scheduling request sent?     [] Yes  [x] No    Does patient need updated?  [] Yes  [x] No

## 2025-01-30 RX ORDER — OMEPRAZOLE 40 MG/1
40 CAPSULE, DELAYED RELEASE ORAL DAILY
Qty: 90 CAPSULE | Refills: 2 | Status: SHIPPED | OUTPATIENT
Start: 2025-01-30

## 2025-01-30 RX ORDER — METFORMIN HYDROCHLORIDE 500 MG/1
TABLET, EXTENDED RELEASE ORAL
Qty: 270 TABLET | Refills: 2 | Status: SHIPPED | OUTPATIENT
Start: 2025-01-30

## 2025-01-30 RX ORDER — GLIPIZIDE 5 MG/1
5 TABLET, FILM COATED, EXTENDED RELEASE ORAL DAILY
Qty: 90 TABLET | Refills: 2 | Status: SHIPPED | OUTPATIENT
Start: 2025-01-30

## 2025-01-30 RX ORDER — CITALOPRAM HYDROBROMIDE 20 MG/1
20 TABLET ORAL DAILY
Qty: 90 TABLET | Refills: 2 | Status: SHIPPED | OUTPATIENT
Start: 2025-01-30

## 2025-01-30 RX ORDER — PRAVASTATIN SODIUM 40 MG
40 TABLET ORAL DAILY
Qty: 90 TABLET | Refills: 2 | Status: SHIPPED | OUTPATIENT
Start: 2025-01-30

## 2025-03-04 DIAGNOSIS — I10 ESSENTIAL HYPERTENSION: ICD-10-CM

## 2025-03-04 RX ORDER — DILTIAZEM HYDROCHLORIDE 180 MG/1
CAPSULE, COATED, EXTENDED RELEASE ORAL
Qty: 90 CAPSULE | Refills: 1 | Status: SHIPPED | OUTPATIENT
Start: 2025-03-04

## 2025-03-04 NOTE — TELEPHONE ENCOUNTER
Name of Medication(s) Requested:  Requested Prescriptions     Pending Prescriptions Disp Refills    dilTIAZem (CARDIZEM CD) 180 MG extended release capsule [Pharmacy Med Name: DILTIAZEM ER 180MG CP24 (AB3)] 90 capsule 1     Sig: TAKE ONE CAPSULE BY MOUTH EVERY DAY. DISCONTINUE AMLODIPINE.       Medication is on current medication list Yes    Dosage and directions were verified? Yes    Quantity verified: 90 day supply     Pharmacy Verified?  Yes    Last Appointment:  9/30/2024    Future appts:  Future Appointments   Date Time Provider Department Center   6/26/2025  8:30 AM Sally Laguna, DO [facility]        (If no appt send self scheduling link. .REFILLAPPT)  Scheduling request sent?     [] Yes  [x] No    Does patient need updated?  [] Yes  [x] No

## 2025-06-18 LAB — DIABETIC RETINOPATHY: POSITIVE

## 2025-06-26 ENCOUNTER — OFFICE VISIT (OUTPATIENT)
Dept: PRIMARY CARE CLINIC | Age: 75
End: 2025-06-26
Payer: MEDICARE

## 2025-06-26 VITALS
BODY MASS INDEX: 24.98 KG/M2 | SYSTOLIC BLOOD PRESSURE: 126 MMHG | WEIGHT: 164.8 LBS | HEART RATE: 92 BPM | OXYGEN SATURATION: 97 % | DIASTOLIC BLOOD PRESSURE: 86 MMHG | TEMPERATURE: 97.8 F | HEIGHT: 68 IN

## 2025-06-26 DIAGNOSIS — J44.9 CHRONIC OBSTRUCTIVE PULMONARY DISEASE, UNSPECIFIED COPD TYPE (HCC): ICD-10-CM

## 2025-06-26 DIAGNOSIS — I10 ESSENTIAL HYPERTENSION: ICD-10-CM

## 2025-06-26 DIAGNOSIS — E11.9 TYPE 2 DIABETES MELLITUS WITHOUT COMPLICATION, WITHOUT LONG-TERM CURRENT USE OF INSULIN (HCC): Primary | ICD-10-CM

## 2025-06-26 DIAGNOSIS — E11.69 ONYCHOMYCOSIS OF MULTIPLE TOENAILS WITH TYPE 2 DIABETES MELLITUS (HCC): ICD-10-CM

## 2025-06-26 DIAGNOSIS — B35.1 ONYCHOMYCOSIS OF MULTIPLE TOENAILS WITH TYPE 2 DIABETES MELLITUS (HCC): ICD-10-CM

## 2025-06-26 LAB — HBA1C MFR BLD: 6.8 %

## 2025-06-26 PROCEDURE — 3074F SYST BP LT 130 MM HG: CPT | Performed by: FAMILY MEDICINE

## 2025-06-26 PROCEDURE — 3079F DIAST BP 80-89 MM HG: CPT | Performed by: FAMILY MEDICINE

## 2025-06-26 PROCEDURE — 99214 OFFICE O/P EST MOD 30 MIN: CPT | Performed by: FAMILY MEDICINE

## 2025-06-26 PROCEDURE — G8399 PT W/DXA RESULTS DOCUMENT: HCPCS | Performed by: FAMILY MEDICINE

## 2025-06-26 PROCEDURE — 3044F HG A1C LEVEL LT 7.0%: CPT | Performed by: FAMILY MEDICINE

## 2025-06-26 PROCEDURE — G8427 DOCREV CUR MEDS BY ELIG CLIN: HCPCS | Performed by: FAMILY MEDICINE

## 2025-06-26 PROCEDURE — 2022F DILAT RTA XM EVC RTNOPTHY: CPT | Performed by: FAMILY MEDICINE

## 2025-06-26 PROCEDURE — 1159F MED LIST DOCD IN RCRD: CPT | Performed by: FAMILY MEDICINE

## 2025-06-26 PROCEDURE — 3017F COLORECTAL CA SCREEN DOC REV: CPT | Performed by: FAMILY MEDICINE

## 2025-06-26 PROCEDURE — 1090F PRES/ABSN URINE INCON ASSESS: CPT | Performed by: FAMILY MEDICINE

## 2025-06-26 PROCEDURE — 83036 HEMOGLOBIN GLYCOSYLATED A1C: CPT | Performed by: FAMILY MEDICINE

## 2025-06-26 PROCEDURE — 1160F RVW MEDS BY RX/DR IN RCRD: CPT | Performed by: FAMILY MEDICINE

## 2025-06-26 PROCEDURE — 3023F SPIROM DOC REV: CPT | Performed by: FAMILY MEDICINE

## 2025-06-26 PROCEDURE — 1123F ACP DISCUSS/DSCN MKR DOCD: CPT | Performed by: FAMILY MEDICINE

## 2025-06-26 PROCEDURE — 1036F TOBACCO NON-USER: CPT | Performed by: FAMILY MEDICINE

## 2025-06-26 PROCEDURE — G8417 CALC BMI ABV UP PARAM F/U: HCPCS | Performed by: FAMILY MEDICINE

## 2025-06-26 SDOH — ECONOMIC STABILITY: FOOD INSECURITY: WITHIN THE PAST 12 MONTHS, THE FOOD YOU BOUGHT JUST DIDN'T LAST AND YOU DIDN'T HAVE MONEY TO GET MORE.: NEVER TRUE

## 2025-06-26 SDOH — ECONOMIC STABILITY: FOOD INSECURITY: WITHIN THE PAST 12 MONTHS, YOU WORRIED THAT YOUR FOOD WOULD RUN OUT BEFORE YOU GOT MONEY TO BUY MORE.: NEVER TRUE

## 2025-06-26 ASSESSMENT — PATIENT HEALTH QUESTIONNAIRE - PHQ9
6. FEELING BAD ABOUT YOURSELF - OR THAT YOU ARE A FAILURE OR HAVE LET YOURSELF OR YOUR FAMILY DOWN: NOT AT ALL
8. MOVING OR SPEAKING SO SLOWLY THAT OTHER PEOPLE COULD HAVE NOTICED. OR THE OPPOSITE, BEING SO FIGETY OR RESTLESS THAT YOU HAVE BEEN MOVING AROUND A LOT MORE THAN USUAL: NOT AT ALL
2. FEELING DOWN, DEPRESSED OR HOPELESS: NOT AT ALL
SUM OF ALL RESPONSES TO PHQ QUESTIONS 1-9: 1
1. LITTLE INTEREST OR PLEASURE IN DOING THINGS: NOT AT ALL
3. TROUBLE FALLING OR STAYING ASLEEP: NOT AT ALL
9. THOUGHTS THAT YOU WOULD BE BETTER OFF DEAD, OR OF HURTING YOURSELF: NOT AT ALL
5. POOR APPETITE OR OVEREATING: NOT AT ALL
10. IF YOU CHECKED OFF ANY PROBLEMS, HOW DIFFICULT HAVE THESE PROBLEMS MADE IT FOR YOU TO DO YOUR WORK, TAKE CARE OF THINGS AT HOME, OR GET ALONG WITH OTHER PEOPLE: NOT DIFFICULT AT ALL
SUM OF ALL RESPONSES TO PHQ QUESTIONS 1-9: 1
SUM OF ALL RESPONSES TO PHQ QUESTIONS 1-9: 1
4. FEELING TIRED OR HAVING LITTLE ENERGY: SEVERAL DAYS
7. TROUBLE CONCENTRATING ON THINGS, SUCH AS READING THE NEWSPAPER OR WATCHING TELEVISION: NOT AT ALL
SUM OF ALL RESPONSES TO PHQ QUESTIONS 1-9: 1

## 2025-06-26 NOTE — PROGRESS NOTES
[facility]  Family Medicine      Patient:  Cornelia Kemp 74 y.o. female  Date of Service: 6/26/25      Chief complaint:   Chief Complaint   Patient presents with    6 Month Follow-Up    Diabetes     Pt no longer follows with Endocrinology.    Hypertension    Hyperlipidemia         History of Present Illness   Cornelia Kemp is a 74 y.o. female who presents to the clinic with complaints as above.    Diabetes Mellitus Type 2  Hemoglobin A1C   Date Value Ref Range Status   06/26/2025 6.8 % Final   Medications include glipizide 5mg daily, metformin 500mg TID, taking as prescribed  Blood sugars at home are doing okay aside from occasional lows  Patient does report low blood sugars during the afternoon.  We will stop glipizide at this time.    Hypertension  BP Readings from Last 3 Encounters:   06/26/25 126/86   12/26/24 129/77   11/14/24 133/76   Blood pressures controlled at home  Medications include diltiazem 180mg daily, taking as prescribed  Denies symptoms of shakiness, dizziness, lightheadedness, high or low blood pressure  Blood work monitoring not due today    Hyperlipidemia   Controlled on last check, not yet due for recheck  Current medications include pravastatin 40mg daily   The 10-year ASCVD risk score (Phan GIANG, et al., 2019) is: 29.1%    Values used to calculate the score:      Age: 74 years      Sex: Female      Is Non- : No      Diabetic: Yes      Tobacco smoker: No      Systolic Blood Pressure: 126 mmHg      Is BP treated: Yes      HDL Cholesterol: 53 mg/dL      Total Cholesterol: 169 mg/dL     Current Health Maintenance:  Health Maintenance   Topic Date Due    DTaP/Tdap/Td vaccine (1 - Tdap) Never done    Shingles vaccine (1 of 2) Never done    Respiratory Syncytial Virus (RSV) Pregnant or age 60 yrs+ (1 - Risk 60-74 years 1-dose series) Never done    COVID-19 Vaccine (4 - 2024-25 season) 09/01/2024    Diabetic foot exam  02/07/2025    Diabetic Alb to Cr

## 2025-08-05 DIAGNOSIS — Z12.31 BREAST CANCER SCREENING BY MAMMOGRAM: Primary | ICD-10-CM

## 2025-08-12 DIAGNOSIS — I10 ESSENTIAL HYPERTENSION: ICD-10-CM

## 2025-08-12 RX ORDER — DILTIAZEM HYDROCHLORIDE 180 MG/1
CAPSULE, COATED, EXTENDED RELEASE ORAL
Qty: 90 CAPSULE | Refills: 1 | Status: SHIPPED | OUTPATIENT
Start: 2025-08-12

## 2025-09-02 ENCOUNTER — HOSPITAL ENCOUNTER (OUTPATIENT)
Dept: MAMMOGRAPHY | Age: 75
Discharge: HOME | End: 2025-09-04
Attending: FAMILY MEDICINE
Payer: MEDICARE

## 2025-09-02 VITALS — HEIGHT: 67 IN | BODY MASS INDEX: 25.11 KG/M2 | WEIGHT: 160 LBS

## 2025-09-02 DIAGNOSIS — Z12.31 BREAST CANCER SCREENING BY MAMMOGRAM: ICD-10-CM

## 2025-09-02 PROCEDURE — 77067 SCR MAMMO BI INCL CAD: CPT
